# Patient Record
Sex: FEMALE | Race: WHITE | Employment: FULL TIME | URBAN - METROPOLITAN AREA
[De-identification: names, ages, dates, MRNs, and addresses within clinical notes are randomized per-mention and may not be internally consistent; named-entity substitution may affect disease eponyms.]

---

## 2017-03-27 ENCOUNTER — APPOINTMENT (OUTPATIENT)
Dept: CT IMAGING | Age: 22
End: 2017-03-27
Payer: COMMERCIAL

## 2017-03-27 ENCOUNTER — HOSPITAL ENCOUNTER (EMERGENCY)
Age: 22
Discharge: HOME | End: 2017-03-27
Attending: EMERGENCY MEDICINE
Payer: COMMERCIAL

## 2017-03-27 VITALS
DIASTOLIC BLOOD PRESSURE: 70 MMHG | OXYGEN SATURATION: 98 % | RESPIRATION RATE: 16 BRPM | BODY MASS INDEX: 27.62 KG/M2 | SYSTOLIC BLOOD PRESSURE: 129 MMHG | WEIGHT: 176 LBS | HEIGHT: 67 IN | HEART RATE: 73 BPM | TEMPERATURE: 98.5 F

## 2017-03-27 DIAGNOSIS — N83.201 CYST OF RIGHT OVARY: Primary | ICD-10-CM

## 2017-03-27 DIAGNOSIS — B35.3 TINEA PEDIS OF BOTH FEET: ICD-10-CM

## 2017-03-27 LAB
-: ABNORMAL
ABSOLUTE EOS #: 0.1 K/UL (ref 0–0.4)
ABSOLUTE LYMPH #: 1.9 K/UL (ref 1.2–5.2)
ABSOLUTE MONO #: 0.5 K/UL (ref 0.2–0.8)
AMORPHOUS: ABNORMAL
ANION GAP SERPL CALCULATED.3IONS-SCNC: 15 MMOL/L (ref 9–17)
BACTERIA: ABNORMAL
BASOPHILS # BLD: 0 % (ref 0–2)
BASOPHILS ABSOLUTE: 0 K/UL (ref 0–0.2)
BILIRUBIN URINE: ABNORMAL
BUN BLDV-MCNC: 11 MG/DL (ref 6–20)
BUN/CREAT BLD: 14 (ref 9–20)
CALCIUM SERPL-MCNC: 9.4 MG/DL (ref 8.6–10.4)
CASTS UA: ABNORMAL /LPF
CHLORIDE BLD-SCNC: 95 MMOL/L (ref 98–107)
CHP ED QC CHECK: NORMAL
CO2: 22 MMOL/L (ref 20–31)
COLOR: YELLOW
COMMENT UA: ABNORMAL
CREAT SERPL-MCNC: 0.79 MG/DL (ref 0.5–0.9)
CRYSTALS, UA: ABNORMAL /HPF
DIFFERENTIAL TYPE: ABNORMAL
EOSINOPHILS RELATIVE PERCENT: 1 % (ref 1–4)
EPITHELIAL CELLS UA: ABNORMAL /HPF
GFR AFRICAN AMERICAN: >60 ML/MIN
GFR NON-AFRICAN AMERICAN: >60 ML/MIN
GFR SERPL CREATININE-BSD FRML MDRD: ABNORMAL ML/MIN/{1.73_M2}
GFR SERPL CREATININE-BSD FRML MDRD: ABNORMAL ML/MIN/{1.73_M2}
GLUCOSE BLD-MCNC: 88 MG/DL (ref 70–99)
GLUCOSE URINE: NEGATIVE
HCT VFR BLD CALC: 43.2 % (ref 36–46)
HEMOGLOBIN: 14.1 G/DL (ref 12–16)
KETONES, URINE: NEGATIVE
LEUKOCYTE ESTERASE, URINE: NEGATIVE
LYMPHOCYTES # BLD: 22 % (ref 25–45)
MCH RBC QN AUTO: 26.5 PG (ref 26–34)
MCHC RBC AUTO-ENTMCNC: 32.6 G/DL (ref 31–37)
MCV RBC AUTO: 81.4 FL (ref 80–100)
MONOCYTES # BLD: 6 % (ref 2–8)
MUCUS: ABNORMAL
NITRITE, URINE: NEGATIVE
OTHER OBSERVATIONS UA: ABNORMAL
PDW BLD-RTO: 14.2 % (ref 11.5–14.5)
PH UA: 5.5 (ref 5–8)
PLATELET # BLD: 323 K/UL (ref 130–400)
PLATELET ESTIMATE: ABNORMAL
PMV BLD AUTO: 8.2 FL (ref 6–12)
POTASSIUM SERPL-SCNC: 3.9 MMOL/L (ref 3.7–5.3)
PREGNANCY TEST URINE, POC: NORMAL
PROTEIN UA: ABNORMAL
RBC # BLD: 5.31 M/UL (ref 4–5.2)
RBC # BLD: ABNORMAL 10*6/UL
RBC UA: ABNORMAL /HPF (ref 0–2)
RENAL EPITHELIAL, UA: ABNORMAL /HPF
SEG NEUTROPHILS: 71 % (ref 34–64)
SEGMENTED NEUTROPHILS ABSOLUTE COUNT: 6.2 K/UL (ref 1.8–8)
SODIUM BLD-SCNC: 132 MMOL/L (ref 135–144)
SPECIFIC GRAVITY UA: 1.03 (ref 1–1.03)
TRICHOMONAS: ABNORMAL
TURBIDITY: ABNORMAL
URINE HGB: ABNORMAL
UROBILINOGEN, URINE: NORMAL
WBC # BLD: 8.7 K/UL (ref 4.5–13.5)
WBC # BLD: ABNORMAL 10*3/UL
WBC UA: ABNORMAL /HPF (ref 0–5)
YEAST: ABNORMAL

## 2017-03-27 PROCEDURE — 36415 COLL VENOUS BLD VENIPUNCTURE: CPT

## 2017-03-27 PROCEDURE — 99284 EMERGENCY DEPT VISIT MOD MDM: CPT

## 2017-03-27 PROCEDURE — 6360000004 HC RX CONTRAST MEDICATION: Performed by: NURSE PRACTITIONER

## 2017-03-27 PROCEDURE — 85025 COMPLETE CBC W/AUTO DIFF WBC: CPT

## 2017-03-27 PROCEDURE — 2580000003 HC RX 258: Performed by: NURSE PRACTITIONER

## 2017-03-27 PROCEDURE — 84703 CHORIONIC GONADOTROPIN ASSAY: CPT

## 2017-03-27 PROCEDURE — 80048 BASIC METABOLIC PNL TOTAL CA: CPT

## 2017-03-27 PROCEDURE — 6360000002 HC RX W HCPCS: Performed by: NURSE PRACTITIONER

## 2017-03-27 PROCEDURE — 96374 THER/PROPH/DIAG INJ IV PUSH: CPT

## 2017-03-27 PROCEDURE — 81001 URINALYSIS AUTO W/SCOPE: CPT

## 2017-03-27 PROCEDURE — 74177 CT ABD & PELVIS W/CONTRAST: CPT

## 2017-03-27 RX ORDER — ONDANSETRON 2 MG/ML
4 INJECTION INTRAMUSCULAR; INTRAVENOUS ONCE
Status: COMPLETED | OUTPATIENT
Start: 2017-03-27 | End: 2017-03-27

## 2017-03-27 RX ORDER — SODIUM CHLORIDE 9 MG/ML
INJECTION, SOLUTION INTRAVENOUS CONTINUOUS
Status: DISCONTINUED | OUTPATIENT
Start: 2017-03-27 | End: 2017-03-28 | Stop reason: HOSPADM

## 2017-03-27 RX ORDER — ACETAMINOPHEN AND CODEINE PHOSPHATE 300; 30 MG/1; MG/1
1 TABLET ORAL 3 TIMES DAILY PRN
Qty: 10 TABLET | Refills: 0 | Status: SHIPPED | OUTPATIENT
Start: 2017-03-27 | End: 2017-04-04 | Stop reason: ALTCHOICE

## 2017-03-27 RX ORDER — CLOTRIMAZOLE 1 %
CREAM (GRAM) TOPICAL
Qty: 1 TUBE | Refills: 0 | Status: SHIPPED | OUTPATIENT
Start: 2017-03-27 | End: 2017-04-03

## 2017-03-27 RX ADMIN — IOVERSOL 125 ML: 741 INJECTION INTRA-ARTERIAL; INTRAVENOUS at 20:32

## 2017-03-27 RX ADMIN — SODIUM CHLORIDE 100 ML/HR: 9 INJECTION, SOLUTION INTRAVENOUS at 20:14

## 2017-03-27 RX ADMIN — ONDANSETRON 4 MG: 2 INJECTION INTRAMUSCULAR; INTRAVENOUS at 20:18

## 2017-03-27 ASSESSMENT — PAIN DESCRIPTION - PROGRESSION: CLINICAL_PROGRESSION: NOT CHANGED

## 2017-03-27 ASSESSMENT — PAIN DESCRIPTION - LOCATION: LOCATION: ABDOMEN

## 2017-03-27 ASSESSMENT — PAIN SCALES - GENERAL: PAINLEVEL_OUTOF10: 10

## 2017-03-27 ASSESSMENT — PAIN DESCRIPTION - PAIN TYPE: TYPE: ACUTE PAIN

## 2017-03-27 ASSESSMENT — PAIN DESCRIPTION - FREQUENCY: FREQUENCY: INTERMITTENT

## 2017-03-27 ASSESSMENT — PAIN DESCRIPTION - DESCRIPTORS: DESCRIPTORS: ACHING;CRAMPING;SHARP

## 2017-03-28 LAB — HCG, PREGNANCY URINE (POC): NEGATIVE

## 2017-03-29 ENCOUNTER — TELEPHONE (OUTPATIENT)
Dept: FAMILY MEDICINE CLINIC | Age: 22
End: 2017-03-29

## 2017-03-31 ASSESSMENT — ENCOUNTER SYMPTOMS
COLOR CHANGE: 0
COUGH: 0
SHORTNESS OF BREATH: 0
NAUSEA: 0
DIARRHEA: 0
RHINORRHEA: 0
ABDOMINAL PAIN: 1
SORE THROAT: 0
SINUS PRESSURE: 0
WHEEZING: 0
VOMITING: 0
CONSTIPATION: 0

## 2017-04-04 ENCOUNTER — OFFICE VISIT (OUTPATIENT)
Dept: OBGYN CLINIC | Age: 22
End: 2017-04-04
Payer: COMMERCIAL

## 2017-04-04 VITALS
WEIGHT: 179 LBS | HEIGHT: 67 IN | HEART RATE: 78 BPM | BODY MASS INDEX: 28.09 KG/M2 | SYSTOLIC BLOOD PRESSURE: 108 MMHG | DIASTOLIC BLOOD PRESSURE: 72 MMHG

## 2017-04-04 DIAGNOSIS — N83.201 RIGHT OVARIAN CYST: Primary | ICD-10-CM

## 2017-04-04 PROCEDURE — 99213 OFFICE O/P EST LOW 20 MIN: CPT | Performed by: OBSTETRICS & GYNECOLOGY

## 2017-04-04 ASSESSMENT — ENCOUNTER SYMPTOMS
BACK PAIN: 1
COUGH: 0
EYE PAIN: 0
ABDOMINAL PAIN: 1
PHOTOPHOBIA: 0
CONSTIPATION: 0
ABDOMINAL DISTENTION: 0
SHORTNESS OF BREATH: 0

## 2017-10-30 ENCOUNTER — HOSPITAL ENCOUNTER (EMERGENCY)
Age: 22
Discharge: HOME | End: 2017-10-30
Payer: COMMERCIAL

## 2017-10-30 VITALS
TEMPERATURE: 98.6 F | RESPIRATION RATE: 16 BRPM | OXYGEN SATURATION: 100 % | DIASTOLIC BLOOD PRESSURE: 86 MMHG | SYSTOLIC BLOOD PRESSURE: 141 MMHG | HEART RATE: 90 BPM

## 2017-10-30 DIAGNOSIS — M26.629 TMJ SYNDROME: Primary | ICD-10-CM

## 2017-10-30 DIAGNOSIS — H92.01 RIGHT EAR PAIN: ICD-10-CM

## 2017-10-30 PROCEDURE — 99282 EMERGENCY DEPT VISIT SF MDM: CPT

## 2017-10-30 RX ORDER — IBUPROFEN 600 MG/1
600 TABLET ORAL EVERY 6 HOURS PRN
Qty: 20 TABLET | Refills: 3 | Status: SHIPPED | OUTPATIENT
Start: 2017-10-30 | End: 2019-05-18

## 2017-10-30 ASSESSMENT — PAIN SCALES - GENERAL: PAINLEVEL_OUTOF10: 6

## 2017-10-30 ASSESSMENT — PAIN DESCRIPTION - ORIENTATION: ORIENTATION: RIGHT;LEFT

## 2017-10-30 ASSESSMENT — PAIN DESCRIPTION - LOCATION: LOCATION: EAR

## 2017-10-30 ASSESSMENT — PAIN DESCRIPTION - FREQUENCY: FREQUENCY: INTERMITTENT

## 2017-10-30 ASSESSMENT — PAIN DESCRIPTION - DESCRIPTORS: DESCRIPTORS: ACHING;CONSTANT

## 2017-10-30 NOTE — ED PROVIDER NOTES
[facility] ED  Emergency Department  Faculty Attestation     Pt Name: Alyssa Taylor  MRN: [id number]  [place] 1995  Date of evaluation: 10/30/17    I was personally available for consultation in the Emergency Department. Have reviewed everything on the chart that is available and agree with the documentation provided by the [facility] AND [facility], including discussion about the assessment, treatment plan and disposition. Alyssa Taylor is a 25 y.o. female who presents with Otalgia (bilateral)      Vitals:   Vitals:    10/30/17 1653   BP: (!) 141/86   Pulse: 90   Resp: 16   Temp: 98.6 °F (37 °C)   SpO2: 100%       Impression:   1. TMJ syndrome    2.  Right ear pain        Fay Jefferson MD  Attending Emergency Physician      (Please note that portions of this note were completed with a voice recognition program.  Efforts were made to edit the dictations but occasionally words are mis-transcribed.)        Fay Jefferson MD  10/31/17 2796
Status    Paternal Uncle     Paternal Grandmother     Paternal Grandfather         SOCIAL HISTORY      reports that she has quit smoking. She has never used smokeless tobacco. She reports that she does not drink alcohol or use drugs. REVIEW OF SYSTEMS    (2-9 systems for level 4, 10 or more for level 5)     REVIEW OF SYSTEMS    Constitutional:  Denies fever, chills, or weakness   Eyes:  Denies vision changes  HENT:  Denies sore throat or neck pain   Respiratory:  Denies cough or shortness of breath   Cardiovascular:  Denies chest pain  GI:  Denies abdominal pain, nausea, vomiting, or diarrhea   Musculoskeletal:  Denies back pain   Skin:  Denies rash  : All systems negative except as marked. Except as noted above the remainder of the review of systems was reviewed and negative. PHYSICAL EXAM    (up to 7 for level 4, 8 or more for level 5)     ED Triage Vitals [10/30/17 1653]   BP Temp Temp src Pulse Resp SpO2 Height Weight   (!) 141/86 98.6 °F (37 °C) -- 90 16 100 % -- --       General Appearance:  Alert, cooperative, no distress , pleasant   Head:  Normocephalic, without obvious abnormality, atraumatic. Eyes:  conjunctiva/corneas clear, EOM's intact. Sclera anicteric. ENT: Mucous membranes moist. Nasal exam reveals no swollen turbinates, septum midline. Tympanic membranes are gray and flat with landmarks well identified. Posterior pharynx pink, without swelling or exudate. Noted grind with click over the TMJ joints, the right side greater than the left. No trismus. Neck: Supple, symmetrical, trachea midline, no adenopathy. Lungs:   Respirations eupneic. Lungs CTA   Chest Wall:  Nontender   Heart:   Abdomen:     Extremities:   Pulses:   Skin:   Neurologic:      RRR  Soft, nontender    Full range of motion. Radial/ulnar pulses 3+    No rashes or lesions to exposed skin. Alert and oriented X 3. Motor grossly normal.  Speech clear.                DIAGNOSTIC RESULTS     No orders to

## 2017-11-01 ENCOUNTER — CARE COORDINATION (OUTPATIENT)
Dept: CARE COORDINATION | Age: 22
End: 2017-11-01

## 2018-12-02 ENCOUNTER — APPOINTMENT (OUTPATIENT)
Dept: GENERAL RADIOLOGY | Age: 23
End: 2018-12-02
Payer: COMMERCIAL

## 2018-12-02 ENCOUNTER — HOSPITAL ENCOUNTER (OUTPATIENT)
Age: 23
Setting detail: OBSERVATION
Discharge: HOME | End: 2018-12-04
Attending: EMERGENCY MEDICINE | Admitting: INTERNAL MEDICINE
Payer: COMMERCIAL

## 2018-12-02 ENCOUNTER — APPOINTMENT (OUTPATIENT)
Dept: CT IMAGING | Age: 23
End: 2018-12-02
Payer: COMMERCIAL

## 2018-12-02 DIAGNOSIS — R56.9 OBSERVED SEIZURE-LIKE ACTIVITY (HCC): Primary | ICD-10-CM

## 2018-12-02 LAB
% CKMB: 0.7 % (ref 0–3)
-: ABNORMAL
ABSOLUTE EOS #: 0.2 K/UL (ref 0–0.4)
ABSOLUTE IMMATURE GRANULOCYTE: ABNORMAL K/UL (ref 0–0.3)
ABSOLUTE LYMPH #: 1.1 K/UL (ref 1–4.8)
ABSOLUTE MONO #: 0.5 K/UL (ref 0.2–0.8)
ACETAMINOPHEN LEVEL: <10 UG/ML (ref 10–30)
ALBUMIN SERPL-MCNC: 4.7 G/DL (ref 3.5–5.2)
ALBUMIN/GLOBULIN RATIO: NORMAL (ref 1–2.5)
ALP BLD-CCNC: 61 U/L (ref 35–104)
ALT SERPL-CCNC: 19 U/L (ref 5–33)
AMORPHOUS: ABNORMAL
AMPHETAMINE SCREEN URINE: NEGATIVE
ANION GAP SERPL CALCULATED.3IONS-SCNC: 13 MMOL/L (ref 9–17)
AST SERPL-CCNC: 24 U/L
BACTERIA: ABNORMAL
BARBITURATE SCREEN URINE: NEGATIVE
BASOPHILS # BLD: 0 % (ref 0–2)
BASOPHILS ABSOLUTE: 0 K/UL (ref 0–0.2)
BENZODIAZEPINE SCREEN, URINE: NEGATIVE
BILIRUB SERPL-MCNC: 0.56 MG/DL (ref 0.3–1.2)
BILIRUBIN DIRECT: 0.13 MG/DL
BILIRUBIN URINE: NEGATIVE
BILIRUBIN, INDIRECT: 0.43 MG/DL (ref 0–1)
BUN BLDV-MCNC: 9 MG/DL (ref 6–20)
BUN/CREAT BLD: 14 (ref 9–20)
BUPRENORPHINE URINE: ABNORMAL
CALCIUM SERPL-MCNC: 9.6 MG/DL (ref 8.6–10.4)
CANNABINOID SCREEN URINE: POSITIVE
CASTS UA: ABNORMAL /LPF
CHLORIDE BLD-SCNC: 102 MMOL/L (ref 98–107)
CHP ED QC CHECK: NORMAL
CK MB: 1.4 NG/ML
CKMB INTERPRETATION: ABNORMAL
CO2: 23 MMOL/L (ref 20–31)
COCAINE METABOLITE, URINE: NEGATIVE
COLOR: YELLOW
COMMENT UA: ABNORMAL
CREAT SERPL-MCNC: 0.64 MG/DL (ref 0.5–0.9)
CRYSTALS, UA: ABNORMAL /HPF
DIFFERENTIAL TYPE: ABNORMAL
EKG ATRIAL RATE: 64 BPM
EKG P AXIS: 41 DEGREES
EKG P-R INTERVAL: 98 MS
EKG Q-T INTERVAL: 422 MS
EKG QRS DURATION: 116 MS
EKG QTC CALCULATION (BAZETT): 435 MS
EKG R AXIS: 75 DEGREES
EKG T AXIS: 64 DEGREES
EKG VENTRICULAR RATE: 64 BPM
EOSINOPHILS RELATIVE PERCENT: 3 % (ref 1–4)
EPITHELIAL CELLS UA: ABNORMAL /HPF (ref 0–5)
ETHANOL PERCENT: <0.01 %
ETHANOL: <10 MG/DL
GFR AFRICAN AMERICAN: >60 ML/MIN
GFR NON-AFRICAN AMERICAN: >60 ML/MIN
GFR SERPL CREATININE-BSD FRML MDRD: NORMAL ML/MIN/{1.73_M2}
GFR SERPL CREATININE-BSD FRML MDRD: NORMAL ML/MIN/{1.73_M2}
GLOBULIN: NORMAL G/DL (ref 1.5–3.8)
GLUCOSE BLD-MCNC: 86 MG/DL (ref 70–99)
GLUCOSE URINE: NEGATIVE
HCT VFR BLD CALC: 41.6 % (ref 36–46)
HEMOGLOBIN: 13.8 G/DL (ref 12–16)
IMMATURE GRANULOCYTES: ABNORMAL %
KETONES, URINE: NEGATIVE
LEUKOCYTE ESTERASE, URINE: NEGATIVE
LYMPHOCYTES # BLD: 13 % (ref 24–44)
MAGNESIUM: 2.2 MG/DL (ref 1.6–2.6)
MCH RBC QN AUTO: 27.9 PG (ref 26–34)
MCHC RBC AUTO-ENTMCNC: 33.1 G/DL (ref 31–37)
MCV RBC AUTO: 84.2 FL (ref 80–100)
MDMA URINE: ABNORMAL
METHADONE SCREEN, URINE: NEGATIVE
METHAMPHETAMINE, URINE: ABNORMAL
MONOCYTES # BLD: 5 % (ref 1–7)
MONONUCLEOSIS SCREEN: NEGATIVE
MUCUS: ABNORMAL
NITRITE, URINE: NEGATIVE
NRBC AUTOMATED: ABNORMAL PER 100 WBC
OPIATES, URINE: NEGATIVE
OTHER OBSERVATIONS UA: ABNORMAL
OXYCODONE SCREEN URINE: NEGATIVE
PDW BLD-RTO: 13.4 % (ref 11.5–14.5)
PH UA: 8.5 (ref 5–8)
PHENCYCLIDINE, URINE: NEGATIVE
PLATELET # BLD: 296 K/UL (ref 130–400)
PLATELET ESTIMATE: ABNORMAL
PMV BLD AUTO: 8 FL (ref 6–12)
POTASSIUM SERPL-SCNC: 4.6 MMOL/L (ref 3.7–5.3)
PREGNANCY TEST URINE, POC: NORMAL
PROPOXYPHENE, URINE: ABNORMAL
PROTEIN UA: NEGATIVE
RBC # BLD: 4.94 M/UL (ref 4–5.2)
RBC # BLD: ABNORMAL 10*6/UL
RBC UA: ABNORMAL /HPF (ref 0–2)
RENAL EPITHELIAL, UA: ABNORMAL /HPF
SALICYLATE LEVEL: <1 MG/DL (ref 3–10)
SEG NEUTROPHILS: 79 % (ref 36–66)
SEGMENTED NEUTROPHILS ABSOLUTE COUNT: 6.9 K/UL (ref 1.8–7.7)
SODIUM BLD-SCNC: 138 MMOL/L (ref 135–144)
SPECIFIC GRAVITY UA: 1.01 (ref 1–1.03)
TEST INFORMATION: ABNORMAL
TOTAL CK: 214 U/L (ref 26–192)
TOTAL PROTEIN: 7.4 G/DL (ref 6.4–8.3)
TOXIC TRICYCLIC SC,BLOOD: NEGATIVE
TRICHOMONAS: ABNORMAL
TRICYCLIC ANTIDEPRESSANTS, UR: ABNORMAL
TURBIDITY: CLEAR
URINE HGB: ABNORMAL
UROBILINOGEN, URINE: NORMAL
WBC # BLD: 8.8 K/UL (ref 3.5–11)
WBC # BLD: ABNORMAL 10*3/UL
WBC UA: ABNORMAL /HPF (ref 0–5)
YEAST: ABNORMAL

## 2018-12-02 PROCEDURE — 85025 COMPLETE CBC W/AUTO DIFF WBC: CPT

## 2018-12-02 PROCEDURE — 96374 THER/PROPH/DIAG INJ IV PUSH: CPT

## 2018-12-02 PROCEDURE — 99218 PR INITIAL OBSERVATION CARE/DAY 30 MINUTES: CPT | Performed by: NURSE PRACTITIONER

## 2018-12-02 PROCEDURE — 83735 ASSAY OF MAGNESIUM: CPT

## 2018-12-02 PROCEDURE — 80076 HEPATIC FUNCTION PANEL: CPT

## 2018-12-02 PROCEDURE — 80307 DRUG TEST PRSMV CHEM ANLYZR: CPT

## 2018-12-02 PROCEDURE — G0378 HOSPITAL OBSERVATION PER HR: HCPCS

## 2018-12-02 PROCEDURE — 70450 CT HEAD/BRAIN W/O DYE: CPT

## 2018-12-02 PROCEDURE — 81001 URINALYSIS AUTO W/SCOPE: CPT

## 2018-12-02 PROCEDURE — 6360000002 HC RX W HCPCS: Performed by: EMERGENCY MEDICINE

## 2018-12-02 PROCEDURE — 86308 HETEROPHILE ANTIBODY SCREEN: CPT

## 2018-12-02 PROCEDURE — 96361 HYDRATE IV INFUSION ADD-ON: CPT

## 2018-12-02 PROCEDURE — 71045 X-RAY EXAM CHEST 1 VIEW: CPT

## 2018-12-02 PROCEDURE — 2580000003 HC RX 258: Performed by: EMERGENCY MEDICINE

## 2018-12-02 PROCEDURE — 82553 CREATINE MB FRACTION: CPT

## 2018-12-02 PROCEDURE — 99285 EMERGENCY DEPT VISIT HI MDM: CPT

## 2018-12-02 PROCEDURE — 93005 ELECTROCARDIOGRAM TRACING: CPT

## 2018-12-02 PROCEDURE — 87086 URINE CULTURE/COLONY COUNT: CPT

## 2018-12-02 PROCEDURE — 82550 ASSAY OF CK (CPK): CPT

## 2018-12-02 PROCEDURE — 80048 BASIC METABOLIC PNL TOTAL CA: CPT

## 2018-12-02 PROCEDURE — G0480 DRUG TEST DEF 1-7 CLASSES: HCPCS

## 2018-12-02 PROCEDURE — 84703 CHORIONIC GONADOTROPIN ASSAY: CPT

## 2018-12-02 RX ORDER — ONDANSETRON 2 MG/ML
4 INJECTION INTRAMUSCULAR; INTRAVENOUS ONCE
Status: COMPLETED | OUTPATIENT
Start: 2018-12-02 | End: 2018-12-02

## 2018-12-02 RX ORDER — LORAZEPAM 2 MG/ML
2 INJECTION INTRAMUSCULAR EVERY 4 HOURS PRN
Status: DISCONTINUED | OUTPATIENT
Start: 2018-12-02 | End: 2018-12-04 | Stop reason: HOSPADM

## 2018-12-02 RX ORDER — 0.9 % SODIUM CHLORIDE 0.9 %
1000 INTRAVENOUS SOLUTION INTRAVENOUS ONCE
Status: COMPLETED | OUTPATIENT
Start: 2018-12-02 | End: 2018-12-02

## 2018-12-02 RX ADMIN — SODIUM CHLORIDE 1000 ML: 9 INJECTION, SOLUTION INTRAVENOUS at 19:58

## 2018-12-02 RX ADMIN — ONDANSETRON 4 MG: 2 INJECTION INTRAMUSCULAR; INTRAVENOUS at 19:58

## 2018-12-02 ASSESSMENT — PAIN SCALES - GENERAL: PAINLEVEL_OUTOF10: 0

## 2018-12-02 NOTE — ED NOTES
Patient is brought in by friend and family and presents with possible seizure this afternoon. Patients friend who was presents states that patient was sleeping when he noticed that she was moving erratically and drooling. Friend tried to wake patient but was unable and states that patient continued with symptoms for 5-10 minutes, but is not sure how long. Patient is alert and oriented and has no history of seizures and walks to room.        Pau Lamas RN  12/02/18 1457

## 2018-12-03 ENCOUNTER — APPOINTMENT (OUTPATIENT)
Dept: MRI IMAGING | Age: 23
End: 2018-12-03
Payer: COMMERCIAL

## 2018-12-03 LAB
ANION GAP SERPL CALCULATED.3IONS-SCNC: 13 MMOL/L (ref 9–17)
BUN BLDV-MCNC: 11 MG/DL (ref 6–20)
BUN/CREAT BLD: 14 (ref 9–20)
CALCIUM SERPL-MCNC: 9 MG/DL (ref 8.6–10.4)
CHLORIDE BLD-SCNC: 103 MMOL/L (ref 98–107)
CO2: 24 MMOL/L (ref 20–31)
CREAT SERPL-MCNC: 0.77 MG/DL (ref 0.5–0.9)
CULTURE: NORMAL
GFR AFRICAN AMERICAN: >60 ML/MIN
GFR NON-AFRICAN AMERICAN: >60 ML/MIN
GFR SERPL CREATININE-BSD FRML MDRD: NORMAL ML/MIN/{1.73_M2}
GFR SERPL CREATININE-BSD FRML MDRD: NORMAL ML/MIN/{1.73_M2}
GLUCOSE BLD-MCNC: 91 MG/DL (ref 70–99)
HCG, PREGNANCY URINE (POC): NEGATIVE
HCT VFR BLD CALC: 38.1 % (ref 36–46)
HEMOGLOBIN: 12.8 G/DL (ref 12–16)
Lab: NORMAL
MCH RBC QN AUTO: 28.2 PG (ref 26–34)
MCHC RBC AUTO-ENTMCNC: 33.5 G/DL (ref 31–37)
MCV RBC AUTO: 84.2 FL (ref 80–100)
NRBC AUTOMATED: NORMAL PER 100 WBC
PDW BLD-RTO: 13.1 % (ref 11.5–14.5)
PLATELET # BLD: 293 K/UL (ref 130–400)
PMV BLD AUTO: 8 FL (ref 6–12)
POTASSIUM SERPL-SCNC: 3.8 MMOL/L (ref 3.7–5.3)
RBC # BLD: 4.52 M/UL (ref 4–5.2)
SODIUM BLD-SCNC: 140 MMOL/L (ref 135–144)
SPECIMEN DESCRIPTION: NORMAL
STATUS: NORMAL
WBC # BLD: 8.4 K/UL (ref 3.5–11)

## 2018-12-03 PROCEDURE — A9579 GAD-BASE MR CONTRAST NOS,1ML: HCPCS | Performed by: PSYCHIATRY & NEUROLOGY

## 2018-12-03 PROCEDURE — 6370000000 HC RX 637 (ALT 250 FOR IP): Performed by: INTERNAL MEDICINE

## 2018-12-03 PROCEDURE — 99225 PR SBSQ OBSERVATION CARE/DAY 25 MINUTES: CPT | Performed by: INTERNAL MEDICINE

## 2018-12-03 PROCEDURE — 6360000002 HC RX W HCPCS: Performed by: NURSE PRACTITIONER

## 2018-12-03 PROCEDURE — 36415 COLL VENOUS BLD VENIPUNCTURE: CPT

## 2018-12-03 PROCEDURE — 70553 MRI BRAIN STEM W/O & W/DYE: CPT

## 2018-12-03 PROCEDURE — 85027 COMPLETE CBC AUTOMATED: CPT

## 2018-12-03 PROCEDURE — 6360000004 HC RX CONTRAST MEDICATION: Performed by: PSYCHIATRY & NEUROLOGY

## 2018-12-03 PROCEDURE — G0378 HOSPITAL OBSERVATION PER HR: HCPCS

## 2018-12-03 PROCEDURE — 95816 EEG AWAKE AND DROWSY: CPT

## 2018-12-03 PROCEDURE — 96375 TX/PRO/DX INJ NEW DRUG ADDON: CPT

## 2018-12-03 PROCEDURE — 2580000003 HC RX 258: Performed by: PSYCHIATRY & NEUROLOGY

## 2018-12-03 PROCEDURE — 80048 BASIC METABOLIC PNL TOTAL CA: CPT

## 2018-12-03 RX ORDER — ACETAMINOPHEN 325 MG/1
650 TABLET ORAL EVERY 4 HOURS PRN
Status: DISCONTINUED | OUTPATIENT
Start: 2018-12-03 | End: 2018-12-04 | Stop reason: HOSPADM

## 2018-12-03 RX ORDER — LORAZEPAM 2 MG/ML
0.5 INJECTION INTRAMUSCULAR ONCE
Status: COMPLETED | OUTPATIENT
Start: 2018-12-04 | End: 2018-12-03

## 2018-12-03 RX ORDER — SODIUM CHLORIDE 0.9 % (FLUSH) 0.9 %
10 SYRINGE (ML) INJECTION
Status: COMPLETED | OUTPATIENT
Start: 2018-12-03 | End: 2018-12-03

## 2018-12-03 RX ADMIN — LORAZEPAM 0.5 MG: 2 INJECTION, SOLUTION INTRAMUSCULAR; INTRAVENOUS at 23:45

## 2018-12-03 RX ADMIN — GADOTERIDOL 15 ML: 279.3 INJECTION, SOLUTION INTRAVENOUS at 13:41

## 2018-12-03 RX ADMIN — Medication 10 ML: at 13:41

## 2018-12-03 RX ADMIN — ACETAMINOPHEN 650 MG: 325 TABLET ORAL at 10:54

## 2018-12-03 ASSESSMENT — PAIN DESCRIPTION - LOCATION: LOCATION: HEAD

## 2018-12-03 ASSESSMENT — PAIN SCALES - GENERAL
PAINLEVEL_OUTOF10: 0
PAINLEVEL_OUTOF10: 3

## 2018-12-03 ASSESSMENT — PAIN DESCRIPTION - PAIN TYPE: TYPE: ACUTE PAIN

## 2018-12-03 ASSESSMENT — PAIN DESCRIPTION - FREQUENCY: FREQUENCY: INTERMITTENT

## 2018-12-03 ASSESSMENT — PAIN DESCRIPTION - ORIENTATION: ORIENTATION: ANTERIOR

## 2018-12-03 ASSESSMENT — PAIN DESCRIPTION - ONSET: ONSET: GRADUAL

## 2018-12-03 ASSESSMENT — PAIN DESCRIPTION - PROGRESSION: CLINICAL_PROGRESSION: GRADUALLY WORSENING

## 2018-12-03 NOTE — PROGRESS NOTES
Pt admitted to room 2020 from ER at 2306  Patient alert and oriented x4. Oriented to room and call light/tv controls. Bed in lowest position, wheels locked, 2/4 side rails up  Seizure pads on bed. Call light in reach, room free of clutter, adequate lighting provided. Boyfriend at bedside. IV intact.

## 2018-12-03 NOTE — H&P
drinking, increase in urination, hot or cold intolerance  MUSCULOSKELETAL:  negative joint pains, muscle aches, swelling of joints  NEUROLOGICAL:  negative for headaches, dizziness, lightheadedness, numbness, pain, tingling extremities  BEHAVIOR/PSYCH:  negative for depression, anxiety    Physical Exam:   /64   Pulse 89   Temp 98.8 °F (37.1 °C) (Oral)   Resp 16   Ht 5' 6\" (1.676 m)   Wt 160 lb (72.6 kg)   LMP 2018   SpO2 97%   BMI 25.82 kg/m²   Temp (24hrs), Av.7 °F (37.1 °C), Min:98.5 °F (36.9 °C), Max:98.8 °F (37.1 °C)    No results for input(s): POCGLU in the last 72 hours. No intake or output data in the 24 hours ending 18 0102    General Appearance:  alert, well appearing, and in no acute distress  Mental status: oriented to person, place, and time with normal affect  Head:  normocephalic, atraumatic. Eye: no icterus, redness, pupils equal and reactive, extraocular eye movements intact, conjunctiva clear  Ear: normal external ear, no discharge, hearing intact  Nose:  no drainage noted. She has mild pain to the proximal area of her nose status post hitting it on a window seal about a week ago. Mouth: mucous membranes moist. Good dentition  Neck: supple, no carotid bruits, thyroid not palpable  Lungs: Bilateral equal air entry, clear to auscultation, no wheezing, rales or rhonchi, normal effort  Cardiovascular: normal rate, regular rhythm, no murmur, gallop, rub. Abdomen: Soft, nontender, nondistended, normal bowel sounds, no hepatomegaly or splenomegaly  Neurologic: There are no new focal motor or sensory deficits, normal muscle tone and bulk, no abnormal sensation, normal speech, cranial nerves II through XII grossly intact  Skin: No gross lesions, rashes, bruising or bleeding on exposed skin area.  Her skin is warm and dry  Extremities:  peripheral pulses palpable, no pedal edema or calf pain with palpation  Psych: normal affect     Investigations:      Laboratory

## 2018-12-03 NOTE — PROGRESS NOTES
him. She doesn't recall any of these events. She denies history of seizure activity. She states over the last 3 days she's had loose stools approximately once a day but she's had no other signs and symptoms of illness. When asked about any trauma she states about a week and half ago while helping move some furniture she hit her face on a window seal. She said the area at the top part of her nose, between her eyes has been sore since then. \"    ROS:  Constitutional: Negative for chills, diaphoresis, fever, malaise/fatigue and weight loss. HENT: Negative for ear pain, hearing loss, nosebleeds, sore throat and tinnitus. Eyes: Negative for blurred vision, double vision, photophobia and pain. Respiratory: Negative for cough, hemoptysis, sputum production, shortness of breath and wheezing. Cardiovascular: Negative for palpitations, orthopnea, claudication, leg swelling and PND. Gastrointestinal: Negative for abdominal pain, blood in stool, constipation, diarrhea, heartburn, melena, nausea and vomiting. Genitourinary: Negative for dysuria, flank pain, frequency, hematuria and urgency. Musculoskeletal: Negative for back pain, falls, joint pain, myalgias and neck pain. Skin: Negative for itching and rash. Neurological: Negative for dizziness, tingling, tremors, sensory change, focal weakness, weakness and headaches. This 1st episode of seizure-like activity. Endo/Heme/Allergies: Does not bruise/bleed easily. Psychiatric/Behavioral: Negative for depression. The patient is not nervous/anxious. Medications: Allergies: No Known Allergies    Current Meds:   PRN Meds:   acetaminophen 650 mg Oral Q4H PRN   LORazepam 2 mg Intravenous Q4H PRN       Data:     Code Status:  No Order     CT Head Wo Contrast  Result Date: 12/2/2018  FINDINGS:   BRAIN/VENTRICLES: There is no acute intracranial hemorrhage, mass effect or midline shift. No abnormal extra-axial fluid collection.   The gray-white differentiation is maintained without evidence of an acute infarct. There is no evidence of hydrocephalus. ORBITS: The visualized portion of the orbits demonstrate no acute abnormality. SINUSES: The visualized paranasal sinuses and mastoid air cells demonstrate no acute abnormality. SOFT TISSUES/SKULL:  No acute abnormality of the visualized skull or soft tissues. Impression:  No acute intracranial abnormality. XR Chest Portable  Result Date: 12/2/2018  COMPARISON: Two views of the chest 03/11/2016  FINDINGS: The heart size and mediastinal contours are normal.  No focal lung consolidation or evidence of pulmonary edema. The costophrenic angles are preserved. Impression:  No acute findings. MRI Brain W Wo Contrast  Result Date: 12/3/2018  COMPARISON: CT brain performed 12/02/2018  FINDINGS:   INTRACRANIAL STRUCTURES/VENTRICLES:  The sellar and suprasellar structures, optic chiasm, corpus callosum, pineal gland, tectum, and midline brainstem structures are unremarkable. There is a degree of cerebellar tonsillar ectopia below the foramen magnum measuring approximately 8 mm. There is no acute intracranial hemorrhage, mass effect, or midline shift. There is satisfactory overall gray-white matter differentiation. The ventricular structures are symmetric and unremarkable. The infratentorial structures including the cerebellopontine angles and internal auditory canals are unremarkable. There is no abnormal restricted diffusion. There is no abnormal blooming artifact on susceptibility weighted imaging. There is no abnormal postcontrast enhancement. ORBITS: The visualized portion of the orbits demonstrate no acute abnormality. SINUSES: The visualized paranasal sinuses and mastoid air cells are well aerated. BONES/SOFT TISSUES: The bone marrow signal intensity appears normal. The soft tissues demonstrate no acute abnormality. Impression:  No acute intracranial abnormality.   No abnormal postcontrast enhancement. 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      Labs:    Hematology:  Recent Labs      12/02/18 1927 12/03/18   0625   WBC  8.8  8.4   RBC  4.94  4.52   HGB  13.8  12.8   HCT  41.6  38.1   MCV  84.2  84.2   MCH  27.9  28.2   MCHC  33.1  33.5   RDW  13.4  13.1   PLT  296  293   MPV  8.0  8.0     Chemistry:  Recent Labs      12/02/18 1927 12/03/18   0625   NA  138  140   K  4.6  3.8   CL  102  103   CO2  23  24   GLUCOSE  86  91   BUN  9  11   CREATININE  0.64  0.77   MG  2.2   --    ANIONGAP  13  13   LABGLOM  >60  >60   GFRAA  >60  >60   CALCIUM  9.6  9.0   CKTOTAL  214*   --    CKMB  1.4   --      Recent Labs      12/02/18 1927   PROT  7.4   LABALBU  4.7   AST  24   ALT  19   ALKPHOS  61   BILITOT  0.56   BILIDIR  0.13         Physical Examination:    /60   Pulse 64   Temp 98.7 °F (37.1 °C) (Oral)   Resp 16   Ht 5' 6\" (1.676 m)   Wt 156 lb 11.2 oz (71.1 kg)   LMP 11/18/2018   SpO2 97%   BMI 25.29 kg/m²   Intake/Output Summary (Last 24 hours) at 12/03/18 1631  Last data filed at 12/03/18 2963   Gross per 24 hour   Intake                0 ml   Output              500 ml   Net             -500 ml       General Appearance:    Alert, cooperative, no distress, appears stated age   Head:    Normocephalic, without obvious abnormality, atraumatic   Eyes:    PERRL, conjunctiva/corneas clear, EOM's intact        Ears:    Normal external ear canals, both ears   Nose:   Nares normal, septum midline, mucosa normal, no drainage    or sinus tenderness   Throat:   Lips, mucosa, and tongue normal; teeth and gums normal   Neck:   Supple, symmetrical, trachea midline, no adenopathy;        thyroid:  No enlargement/tenderness/nodules; no carotid    bruit or JVD   Back:     Symmetric, no curvature, ROM normal, no CVA tenderness   Lungs:     Clear to auscultation bilaterally, respirations unlabored   Chest wall:    No

## 2018-12-03 NOTE — ED PROVIDER NOTES
Cancer-Smoker    Other Paternal Grandmother         Hysterectomy due to some type of cancer    Other Paternal Grandfather         DM2, COPD, MI X4     Family Status   Relation Status    PUnc (Not Specified)    PGM (Not Specified)    PGF (Not Specified)        SOCIAL HISTORY      reports that she has quit smoking. She has never used smokeless tobacco. She reports that she does not drink alcohol or use drugs. REVIEW OF SYSTEMS    (2-9 systems for level 4, 10 or more for level 5)     Review of Systems   All other systems reviewed and are negative. Except as noted above the remainder of the review of systems was reviewed and negative. PHYSICAL EXAM    (up to 7 for level 4, 8 or more for level 5)     Vitals:    12/02/18 1733   BP: 124/69   Pulse: 89   Resp: 16   Temp: 98.5 °F (36.9 °C)   TempSrc: Oral   SpO2: 98%   Weight: 160 lb (72.6 kg)   Height: 5' 6\" (1.676 m)       Physical exam reflects a well-nourished well-hydrated female. She is afebrile with stable vital signs to include pulse ox of 98% on room air. She's not hypoxic. She is alert conversive and appropriate behavior. She is not in distress. GCS 15. Cranial nerves II through XII are grossly intact. Visual acuities are grossly normal.  Neck soft and supple no meningismus. Oropharyngeal exam without lesion. Trachea midline no stridor. No difficulty breathing speaking or swallowing. Heart regular rate and rhythm normal S1-S2 no murmurs rubs gallops. Lungs are clear to auscultation without wheezes rales or rhonchi. Abdomen is soft throughout no focal pain. Extremities show no deformities. No bony point tenderness decreased range of motion or complaint of pain. Integument shows a few scattered petechial like lesions no other rash or lesion noted.   No gross neurovascular deficits      DIAGNOSTIC RESULTS     EKG: All EKG's are interpreted by the Emergency Department Physician who either signs or Co-signs this chart in the absence of a cardiologist.    EKG demonstrates sinus rhythm rate in the 60s. Right bundle branch block noted.   No ectopy or ischemic change noted    RADIOLOGY:   Non-plain film images such as CT, Ultrasound and MRI are read by the radiologist. Katelin Sanchez radiographic images are visualized and preliminarily interpreted by the emergency physician with the below findings:    CT Head WO Contrast   Status: Final result   Order Providers     Authorizing Billing   MD Dima Maya MD          Signed by     Signed Date/Time  Phone Pager   Lupe Parker 12/02/2018 20:58 [phone]    Reading Radiologists     Read Date Phone Pager   Lupe Parker Dec 2, 2018 [phone]    Radiation Dose Estimates     No radiation information found for this patient   Narrative   EXAMINATION:   CT OF THE HEAD WITHOUT CONTRAST  12/2/2018 7:45 pm       TECHNIQUE:   CT of the head was performed without the administration of intravenous   contrast. Dose modulation, iterative reconstruction, and/or weight based   adjustment of the mA/kV was utilized to reduce the radiation dose to as low   as reasonably achievable.       COMPARISON:   None.       HISTORY:   ORDERING SYSTEM PROVIDED HISTORY: seizure activity   TECHNOLOGIST PROVIDED HISTORY:           FINDINGS:   BRAIN/VENTRICLES: There is no acute intracranial hemorrhage, mass effect or   midline shift.  No abnormal extra-axial fluid collection.  The gray-white   differentiation is maintained without evidence of an acute infarct.  There is   no evidence of hydrocephalus.       ORBITS: The visualized portion of the orbits demonstrate no acute abnormality.       SINUSES: The visualized paranasal sinuses and mastoid air cells demonstrate   no acute abnormality.       SOFT TISSUES/SKULL:  No acute abnormality of the visualized skull or soft   tissues.           Impression   No acute intracranial abnormality.             Interpretation per the Radiologist below, if available at the time of this note:    XR CHEST PORTABLE   Final Result   No acute findings. CT Head WO Contrast   Final Result   No acute intracranial abnormality. LABS:  Labs Reviewed   TOX SCR, BLD, ED - Abnormal; Notable for the following:        Result Value    Salicylate Lvl <1 (*)     Acetaminophen Level <10 (*)     All other components within normal limits   URINALYSIS - Abnormal; Notable for the following:     Urine Hgb TRACE (*)     pH, UA 8.5 (*)     All other components within normal limits   URINE DRUG SCREEN - Abnormal; Notable for the following:     Cannabinoid Scrn, Ur POSITIVE (*)     All other components within normal limits   CK ISOENZYMES - Abnormal; Notable for the following: Total  (*)     All other components within normal limits   CBC WITH AUTO DIFFERENTIAL - Abnormal; Notable for the following:     Seg Neutrophils 79 (*)     Lymphocytes 13 (*)     All other components within normal limits   MICROSCOPIC URINALYSIS - Abnormal; Notable for the following:     Bacteria, UA MANY (*)     All other components within normal limits   POCT URINE PREGNANCY - Normal   URINE CULTURE CLEAN CATCH   BASIC METABOLIC PANEL   HEPATIC FUNCTION PANEL   MAGNESIUM   MONONUCLEOSIS SCREEN     Results for Yomaira Hicks (MRN [id number]) as of 12/2/2018 20:30   Ref.  Range 12/2/2018 18:40 12/2/2018 19:27 12/2/2018 19:34   Sodium Latest Ref Range: 135 - 144 mmol/L  138    Potassium Latest Ref Range: 3.7 - 5.3 mmol/L  4.6    Chloride Latest Ref Range: 98 - 107 mmol/L  102    CO2 Latest Ref Range: 20 - 31 mmol/L  23    BUN Latest Ref Range: 6 - 20 mg/dL  9    Creatinine Latest Ref Range: 0.50 - 0.90 mg/dL  0.64    Bun/Cre Ratio Latest Ref Range: 9 - 20   14    Anion Gap Latest Ref Range: 9 - 17 mmol/L  13    GFR Non- Latest Ref Range: >60 mL/min  >60    GFR  Latest Ref Range: >60 mL/min  >60    Magnesium Latest Ref Range: 1.6 - 2.6 mg/dL  2.2    Glucose Latest Physician         Deb oMbley MD  12/02/18 5629

## 2018-12-04 VITALS
DIASTOLIC BLOOD PRESSURE: 55 MMHG | SYSTOLIC BLOOD PRESSURE: 104 MMHG | WEIGHT: 159.5 LBS | BODY MASS INDEX: 25.63 KG/M2 | OXYGEN SATURATION: 98 % | TEMPERATURE: 98.2 F | HEIGHT: 66 IN | HEART RATE: 65 BPM | RESPIRATION RATE: 16 BRPM

## 2018-12-04 LAB
SEDIMENTATION RATE, ERYTHROCYTE: 3 MM (ref 0–20)
TSH SERPL DL<=0.05 MIU/L-ACNC: 1.66 MIU/L (ref 0.3–5)

## 2018-12-04 PROCEDURE — 6370000000 HC RX 637 (ALT 250 FOR IP): Performed by: PSYCHIATRY & NEUROLOGY

## 2018-12-04 PROCEDURE — 85651 RBC SED RATE NONAUTOMATED: CPT

## 2018-12-04 PROCEDURE — 99225 PR SBSQ OBSERVATION CARE/DAY 25 MINUTES: CPT | Performed by: INTERNAL MEDICINE

## 2018-12-04 PROCEDURE — G0378 HOSPITAL OBSERVATION PER HR: HCPCS

## 2018-12-04 PROCEDURE — 84443 ASSAY THYROID STIM HORMONE: CPT

## 2018-12-04 PROCEDURE — 36415 COLL VENOUS BLD VENIPUNCTURE: CPT

## 2018-12-04 RX ORDER — LEVETIRACETAM 500 MG/1
500 TABLET ORAL 2 TIMES DAILY
Status: DISCONTINUED | OUTPATIENT
Start: 2018-12-04 | End: 2018-12-04 | Stop reason: HOSPADM

## 2018-12-04 RX ORDER — LEVETIRACETAM 500 MG/1
500 TABLET ORAL 2 TIMES DAILY
Qty: 60 TABLET | Refills: 3 | Status: SHIPPED | OUTPATIENT
Start: 2018-12-04

## 2018-12-04 RX ADMIN — LEVETIRACETAM 500 MG: 500 TABLET ORAL at 13:19

## 2018-12-04 NOTE — DISCHARGE SUMMARY
Recent Labs      12/02/18 1927 12/03/18   0625  12/04/18   0738   WBC  8.8  8.4   --    RBC  4.94  4.52   --    HGB  13.8  12.8   --    HCT  41.6  38.1   --    MCV  84.2  84.2   --    MCH  27.9  28.2   --    MCHC  33.1  33.5   --    RDW  13.4  13.1   --    PLT  296  293   --    MPV  8.0  8.0   --    SEDRATE   --    --   3      Chemistry:       Recent Labs      12/02/18 1927 12/03/18   0625   NA  138  140   K  4.6  3.8   CL  102  103   CO2  23  24   GLUCOSE  86  91   BUN  9  11   CREATININE  0.64  0.77   MG  2.2   --    ANIONGAP  13  13   LABGLOM  >60  >60   GFRAA  >60  >60   CALCIUM  9.6  9.0   CKTOTAL  214*   --    CKMB  1.4   --            Recent Labs      12/02/18 1927 12/04/18   0738   PROT  7.4   --    LABALBU  4.7   --    TSH   --   1.66   AST  24   --    ALT  19   --    ALKPHOS  61   --    BILITOT  0.56   --    BILIDIR  0.13   --         Consultations:    Consults:     Final Specialist Recommendations/Findings:   IP CONSULT TO INTERNAL MEDICINE  IP CONSULT TO NEUROLOGY      The patient was seen and examined on day of discharge and this discharge summary is in conjunction with any daily progress note from day of discharge. Discharge plan:     Disposition: Home    Physician Follow Up:     Tran Daniels MD  [address] [place] [address]  [phone]    In 1 week         Requiring Further Evaluation/Follow Up POST HOSPITALIZATION/Incidental Findings:   Follow-up with neurology in 1 week  Discussed with neurology resumption of Lexapro    Diet: regular diet    Activity:   No driving motor vehicle for 6 months  Avoid using mechanical equipment  Avoid climbing ladders  Showers versus tub    Instructions to Patient:   Keppra 500 mg twice daily  Discussed with neurology resuming Lexapro  No driving motor vehicle for 6 months  Avoid using mechanical equipment  Avoid climbing ladders  Showers versus tub    Discharge Medications:      Medication List      START taking these medications    levETIRAcetam 500 MG tablet  Commonly known as:  KEPPRA  Take 1 tablet by mouth 2 times daily        CONTINUE taking these medications    acetaminophen 325 MG tablet  Commonly known as:  TYLENOL     * ibuprofen 200 MG tablet  Commonly known as:  ADVIL;MOTRIN     * ibuprofen 600 MG tablet  Commonly known as:  ADVIL;MOTRIN  Take 1 tablet by mouth every 6 hours as needed for Pain        * This list has 2 medication(s) that are the same as other medications prescribed for you. Read the directions carefully, and ask your doctor or other care provider to review them with you. STOP taking these medications    escitalopram 10 MG tablet  Commonly known as:  Annemarie Green           Where to Get Your Medications      You can get these medications from any pharmacy    Bring a paper prescription for each of these medications  · levETIRAcetam 500 MG tablet         Time Spent on discharge is  41 mins in patient examination, evaluation, counseling as well as medication reconciliation, prescriptions for required medications, discharge plan and follow up.     Electronically signed by   Usha Germain DO  12/4/2018  3:49 PM

## 2018-12-04 NOTE — PROGRESS NOTES
Upon doing my hourly rounding walked into the patient room and she was crying. Asked her what was wrong and she just said she wants to go home. Patient said she just needs something to help her sleep and she takes Benadryl at home when she cant sleep. Sent a secure message to [facility]. A one time dose of Ativan 0.5mg was ordered.

## 2018-12-04 NOTE — PLAN OF CARE
Problem: Injury - Risk of, Healthcare-Acquired Condition:  Goal: Will remain free from falls  Will remain free from falls   Outcome: Ongoing

## 2018-12-04 NOTE — PROGRESS NOTES
awake and he could not wake her. He was concerned she might choke so he rolled her to her side. He states it took several minutes to wake her up and when he finally got her to wake up she was just staring off and not really responding to him. She doesn't recall any of these events. She denies history of seizure activity. She states over the last 3 days she's had loose stools approximately once a day but she's had no other signs and symptoms of illness. When asked about any trauma she states about a week and half ago while helping move some furniture she hit her face on a window seal. She said the area at the top part of her nose, between her eyes has been sore since then. \"    ROS:  Constitutional: Negative for chills, diaphoresis, fever, malaise/fatigue and weight loss. HENT: Negative for ear pain, hearing loss, nosebleeds, sore throat and tinnitus. Eyes: Negative for blurred vision, double vision, photophobia and pain. Respiratory: Negative for cough, hemoptysis, sputum production, shortness of breath and wheezing. Cardiovascular: Negative for palpitations, orthopnea, claudication, leg swelling and PND. Gastrointestinal: Negative for abdominal pain, blood in stool, constipation, diarrhea, heartburn, melena, nausea and vomiting. Genitourinary: Negative for dysuria, flank pain, frequency, hematuria and urgency. Musculoskeletal: Negative for back pain, falls, joint pain, myalgias and neck pain. Skin: Negative for itching and rash. Neurological: Negative for dizziness, tingling, tremors, sensory change, focal weakness, weakness and headaches. This 1st episode of seizure-like activity. Endo/Heme/Allergies: Does not bruise/bleed easily. Psychiatric/Behavioral: Negative for depression. The patient is not nervous/anxious. Medications:      Allergies: No Known Allergies    Current Meds:    levETIRAcetam  500 mg Oral BID     PRN Meds:     acetaminophen 650 mg Oral Q4H PRN   LORazepam 2 mg 12/04/18 1531  Last data filed at 12/03/18 2003   Gross per 24 hour   Intake                0 ml   Output              200 ml   Net             -200 ml       General Appearance:    Alert, cooperative, no distress, appears stated age   Head:    Normocephalic, without obvious abnormality, atraumatic   Eyes:    PERRL, conjunctiva/corneas clear, EOM's intact        Ears:    Normal external ear canals, both ears   Nose:   Nares normal, septum midline, mucosa normal, no drainage    or sinus tenderness   Throat:   Lips, mucosa, and tongue normal; teeth and gums normal   Neck:   Supple, symmetrical, trachea midline, no adenopathy;        thyroid:  No enlargement/tenderness/nodules; no carotid    bruit or JVD   Back:     Symmetric, no curvature, ROM normal, no CVA tenderness   Lungs:     Clear to auscultation bilaterally, respirations unlabored   Chest wall:    No tenderness or deformity   Heart:    Regular rate and rhythm, S1 and S2 normal, no murmur, rub   or gallop   Abdomen:     Soft, non-tender, bowel sounds active all four quadrants,     no masses, no organomegaly   Extremities:   Extremities normal, atraumatic, no cyanosis or edema   Pulses:   2+ and symmetric all extremities   Skin:   Skin color, texture, turgor normal, no rashes or lesions   Lymph nodes:   Cervical, supraclavicular, and axillary nodes normal   Neurologic:   CNII-XII intact. Normal strength, sensation and reflexes       throughout       Assessment:     Primary Problem  Observed seizure-like activity [facility])     Active Hospital Problems    Diagnosis Date Noted    Observed seizure-like activity ([facility] [address] [R56.9] 12/02/2018     Past Medical History:   Diagnosis Date    Adverse reaction to birth control pills 2015    vomiting with ortho-cyclen, every day X2 weeks    Back pain     with periods    Dysmenorrhea     Menorrhagia         Consultations:     IP CONSULT TO INTERNAL MEDICINE  IP CONSULT TO NEUROLOGY    Plan:     1. Discharge home today  2.  Alethea Christy

## 2018-12-04 NOTE — PROGRESS NOTES
The pt was discharged to home. The discharge papers were given and reviewed and given. She was escorted to the front door in stable condition.

## 2019-05-18 ENCOUNTER — HOSPITAL ENCOUNTER (EMERGENCY)
Age: 24
Discharge: HOME | End: 2019-05-18
Payer: COMMERCIAL

## 2019-05-18 VITALS
OXYGEN SATURATION: 97 % | HEART RATE: 86 BPM | SYSTOLIC BLOOD PRESSURE: 117 MMHG | BODY MASS INDEX: 25.01 KG/M2 | TEMPERATURE: 98.5 F | WEIGHT: 165 LBS | DIASTOLIC BLOOD PRESSURE: 78 MMHG | RESPIRATION RATE: 16 BRPM | HEIGHT: 68 IN

## 2019-05-18 DIAGNOSIS — R56.9 SEIZURE (HCC): Primary | ICD-10-CM

## 2019-05-18 LAB
ABSOLUTE EOS #: 0.06 K/UL (ref 0–0.44)
ABSOLUTE IMMATURE GRANULOCYTE: 0.01 K/UL (ref 0–0.3)
ABSOLUTE LYMPH #: 0.92 K/UL (ref 1.1–3.7)
ABSOLUTE MONO #: 0.39 K/UL (ref 0.1–1.2)
ANION GAP SERPL CALCULATED.3IONS-SCNC: 9 MMOL/L (ref 9–17)
APPEARANCE: CLEAR
BASOPHILS # BLD: 1 % (ref 0–2)
BASOPHILS ABSOLUTE: 0.04 K/UL (ref 0–0.2)
BILIRUBIN URINE: NEGATIVE
BILIRUBIN, POC: NEGATIVE
BLOOD URINE, POC: NEGATIVE
BUN BLDV-MCNC: 10 MG/DL (ref 6–20)
BUN/CREAT BLD: 16 (ref 9–20)
CALCIUM SERPL-MCNC: 9.1 MG/DL (ref 8.6–10.4)
CHLORIDE BLD-SCNC: 103 MMOL/L (ref 98–107)
CHP ED QC CHECK: YES
CHP ED QC CHECK: YES
CLARITY, POC: CLEAR
CO2: 23 MMOL/L (ref 20–31)
COLOR, POC: YELLOW
COLOR: YELLOW
COMMENT UA: ABNORMAL
CREAT SERPL-MCNC: 0.64 MG/DL (ref 0.5–0.9)
DIFFERENTIAL TYPE: ABNORMAL
EOSINOPHILS RELATIVE PERCENT: 1 % (ref 1–4)
GFR AFRICAN AMERICAN: >60 ML/MIN
GFR NON-AFRICAN AMERICAN: >60 ML/MIN
GFR SERPL CREATININE-BSD FRML MDRD: NORMAL ML/MIN/{1.73_M2}
GFR SERPL CREATININE-BSD FRML MDRD: NORMAL ML/MIN/{1.73_M2}
GLUCOSE BLD-MCNC: 98 MG/DL (ref 70–99)
GLUCOSE URINE, POC: NEGATIVE
GLUCOSE URINE: NEGATIVE
HCT VFR BLD CALC: 43 % (ref 36.3–47.1)
HEMOGLOBIN: 13.8 G/DL (ref 11.9–15.1)
IMMATURE GRANULOCYTES: 0 %
KEPPRA: 7 UG/ML
KETONES, POC: NEGATIVE
KETONES, URINE: NEGATIVE
LEUKOCYTE EST, POC: NEGATIVE
LEUKOCYTE ESTERASE, URINE: NEGATIVE
LYMPHOCYTES # BLD: 14 % (ref 24–43)
MCH RBC QN AUTO: 27.7 PG (ref 25.2–33.5)
MCHC RBC AUTO-ENTMCNC: 32.1 G/DL (ref 28.4–34.8)
MCV RBC AUTO: 86.3 FL (ref 82.6–102.9)
MONOCYTES # BLD: 6 % (ref 3–12)
NITRITE, POC: NEGATIVE
NITRITE, URINE: NEGATIVE
NRBC AUTOMATED: 0 PER 100 WBC
PDW BLD-RTO: 12.8 % (ref 11.8–14.4)
PH UA: 8.5 (ref 5–8)
PH, POC: 8.5
PLATELET # BLD: 281 K/UL (ref 138–453)
PLATELET ESTIMATE: ABNORMAL
PMV BLD AUTO: 9.7 FL (ref 8.1–13.5)
POTASSIUM SERPL-SCNC: 4.1 MMOL/L (ref 3.7–5.3)
PREGNANCY TEST URINE, POC: NEGATIVE
PROTEIN UA: NEGATIVE
PROTEIN, POC: NORMAL
RBC # BLD: 4.98 M/UL (ref 3.95–5.11)
RBC # BLD: ABNORMAL 10*6/UL
SEG NEUTROPHILS: 78 % (ref 36–65)
SEGMENTED NEUTROPHILS ABSOLUTE COUNT: 4.96 K/UL (ref 1.5–8.1)
SODIUM BLD-SCNC: 135 MMOL/L (ref 135–144)
SPECIFIC GRAVITY UA: 1.01 (ref 1–1.03)
SPECIFIC GRAVITY, POC: 1.01
TURBIDITY: CLEAR
URINE HGB: NEGATIVE
UROBILINOGEN, POC: 0.2
UROBILINOGEN, URINE: NORMAL
WBC # BLD: 6.4 K/UL (ref 3.5–11.3)
WBC # BLD: ABNORMAL 10*3/UL

## 2019-05-18 PROCEDURE — 96374 THER/PROPH/DIAG INJ IV PUSH: CPT

## 2019-05-18 PROCEDURE — 6360000002 HC RX W HCPCS: Performed by: NURSE PRACTITIONER

## 2019-05-18 PROCEDURE — 85025 COMPLETE CBC W/AUTO DIFF WBC: CPT

## 2019-05-18 PROCEDURE — 84703 CHORIONIC GONADOTROPIN ASSAY: CPT

## 2019-05-18 PROCEDURE — 81003 URINALYSIS AUTO W/O SCOPE: CPT

## 2019-05-18 PROCEDURE — 80048 BASIC METABOLIC PNL TOTAL CA: CPT

## 2019-05-18 PROCEDURE — 2580000003 HC RX 258: Performed by: NURSE PRACTITIONER

## 2019-05-18 PROCEDURE — 99284 EMERGENCY DEPT VISIT MOD MDM: CPT

## 2019-05-18 PROCEDURE — 80177 DRUG SCRN QUAN LEVETIRACETAM: CPT

## 2019-05-18 RX ORDER — NAPROXEN 250 MG/1
1 TABLET ORAL
COMMUNITY
End: 2020-03-06

## 2019-05-18 RX ORDER — LEVETIRACETAM 500 MG/1
500 TABLET ORAL 2 TIMES DAILY
Qty: 60 TABLET | Refills: 0 | Status: SHIPPED | OUTPATIENT
Start: 2019-05-18 | End: 2019-09-19 | Stop reason: SDUPTHER

## 2019-05-18 RX ADMIN — LEVETIRACETAM 1000 MG: 100 INJECTION, SOLUTION INTRAVENOUS at 16:07

## 2019-05-18 ASSESSMENT — PAIN SCALES - GENERAL: PAINLEVEL_OUTOF10: 3

## 2019-05-18 ASSESSMENT — PAIN DESCRIPTION - FREQUENCY: FREQUENCY: CONTINUOUS

## 2019-05-18 ASSESSMENT — PAIN DESCRIPTION - DESCRIPTORS: DESCRIPTORS: TENDER

## 2019-05-18 ASSESSMENT — PAIN DESCRIPTION - LOCATION: LOCATION: LEG;NECK

## 2019-05-18 NOTE — ED PROVIDER NOTES
[address] [place] [address] ED  eMERGENCY dEPARTMENT eNCOUnter      Pt Name: Luis Avila  MRN: [id number]  Elizabethgfdeirdre 1995  Date of evaluation: 5/18/2019  Provider: Paulina Olivia       Chief Complaint   Patient presents with    Seizures     onset today         HISTORY OFPRESENT ILLNESS  (Location/Symptom, Timing/Onset, Context/Setting, Quality, Duration, Modifying Factors, Severity.)   Luis Avila is a 26 y.o. female who presents to the emergency department for evaluation of seizure today. Patient was dropped off to the emergency department by her father. Patient states that she had witnessed seizure by her boyfriend around 1:00 today while she was lying in bed. She has a history of seizures. She states her last seizure was last December. She is supposed to be taking Keppra 500 mg twice daily for seizures but states for the past week she has only been taking her Keppra once a day due to cost issues. Patient states she feels extremely fatigued upon arrival.  She states she did bite her tongue. Tongue pain as a 3 out of 10. Patient states she follows up with Dr. Salvatore Valentino with neurology to schedule an appointment in June. She arrived her brain last December which showed questionable Chiari I malformation. Nursing Notes were reviewed. PASTMEDICAL HISTORY     Past Medical History:   Diagnosis Date    Adverse reaction to birth control pills 2015    vomiting with ortho-cyclen, every day X2 weeks    Back pain     with periods    Dysmenorrhea     Menorrhagia          SURGICAL HISTORY       Past Surgical History:   Procedure Laterality Date    TYMPANOSTOMY TUBE PLACEMENT  age 2         CURRENT MEDICATIONS     Previous Medications    LEVETIRACETAM (KEPPRA) 500 MG TABLET    Take 1 tablet by mouth 2 times daily    NAPROXEN (NAPROSYN) 250 MG TABLET    Take 1 tablet by mouth every 6-8 hours as needed        ALLERGIES     Patient has no known allergies.     FAMILY HISTORY       Family level 5)     ED Triage Vitals [05/18/19 1504]   BP Temp Temp Source Pulse Resp SpO2 Height Weight   117/78 98.5 °F (36.9 °C) Oral 86 16 97 % 5' 8\" (1.727 m) 165 lb (74.8 kg)       Physical Exam   Constitutional: She is oriented to person, place, and time. She appears well-developed and well-nourished. HENT:   Head: Normocephalic and atraumatic. Right Ear: Hearing and external ear normal.   Left Ear: Hearing and external ear normal.   Nose: Nose normal.   Mouth/Throat: Uvula is midline, oropharynx is clear and moist and mucous membranes are normal. Lacerations present. Eyes: Pupils are equal, round, and reactive to light. Conjunctivae, EOM and lids are normal.   Pupils are 2 mm and equally reactive to light. Neck: Normal range of motion and full passive range of motion without pain. Neck supple. Cardiovascular: Normal rate, regular rhythm, S1 normal, S2 normal, normal heart sounds, intact distal pulses and normal pulses. Pulmonary/Chest: Effort normal and breath sounds normal.   Abdominal: Soft. Normal appearance and bowel sounds are normal. There is no tenderness. Musculoskeletal: Normal range of motion. Neurological: She is alert and oriented to person, place, and time. She has normal strength. No cranial nerve deficit or sensory deficit. GCS eye subscore is 4. GCS verbal subscore is 5. GCS motor subscore is 6. Skin: Skin is warm, dry and intact. Capillary refill takes less than 2 seconds. Psychiatric: She has a normal mood and affect.  Her behavior is normal. Judgment and thought content normal.         DIAGNOSTIC RESULTS     EKG:All EKG's are interpreted by the Emergency Department Physician who either signs or Co-signs this chart in the absence of a cardiologist.        RADIOLOGY:   Non-plain film images such as CT, Ultrasound and MRI are read by theradiologist. Plain radiographic images are visualized and preliminarily interpreted by the emergency physician with the below findings:        Interpretation per the Radiologist below, if available at the time of this note:    No orders to display         EDBEDSIDE ULTRASOUND:   Performed by Cassidy Blood - dalila    LABS:  [unfilled]    All other labs were within normal range or not returned as of this dictation. EMERGENCY DEPARTMENT COURSE andDIFFERENTIAL DIAGNOSIS/MDM:   Patient was evaluated in conjunction with attending physician. Urine pregnancy negative. Urinalysis does not show infection. Labs reviewed. Keppra level 7. The patient was given 1 g of [address] [facility] emergency department. She was observed for several hours and did not have any more seizure activity. She is alert and oriented ×3. No neurological deficits. Seizure precautions provided. Patient has had issues with the cost of the Keppra medication. She was instructed to follow-up with her neurologist on Monday. Vitals:    Vitals:    05/18/19 1504   BP: 117/78   Pulse: 86   Resp: 16   Temp: 98.5 °F (36.9 °C)   TempSrc: Oral   SpO2: 97%   Weight: 165 lb (74.8 kg)   Height: 5' 8\" (1.727 m)         CONSULTS:  None    PROCEDURES:  Procedures    FINAL IMPRESSION      1. Seizure [facility])          DISPOSITION/PLAN   DISPOSITION Decision To Discharge 05/18/2019 05:20:56 PM      PATIENT REFERRED TO:     Follow-up with your neurologist on Monday.         [facility] ED  [address] [facility]  [phone]    As needed      DISCHARGE MEDICATIONS:     New Prescriptions    LEVETIRACETAM (KEPPRA) 500 MG TABLET    Take 1 tablet by mouth 2 times daily     Electronically signed by KATELNI Navarrete 5/18/2019 at 5:28 PM           KATELIN Navarrete CNP  05/18/19 5544

## 2019-05-18 NOTE — ED NOTES
Pt to room 18 via w/c with c/o seizure at approximately 1300 today. Pt presents to ED A&Ox4. Pt reports hx of seizures, Rx'd PO Keppra 500mg BID, states \"I'm running low so I've been trying to stretch it out as best I can\" and \"I have only been taking it like once a day because they keep marking the price up on me and my insurance is trying to say they won't cover it\". Pt reports she was in bed sleeping at home when she had a witnessed seizure per her boyfriend who reported to her that she started choking in her sleep, so he rolled her over in the bed, and then pt had a moderate amount of saliva and blood that had came out of her mouth once seizure had finished. Pt reports her boyfriend did not report to her how long the seizure lasted. Pt denies any urinary or stool incontinence during seizure. Pt has small laceration noted to the tip of the right side of her tongue, no active bleeding. Pt reports pain in BLE and posterior neck, denies any injuries during the seizure. Pt denies any recent N/V or fevers. Abd soft, flat, BS active x 4 quads. Respirations even and non labored. Skin PWD, MMM. NAD noted. Pt requesting work note, states \"I called off because of the seizure and they're telling me I need a note to come back\". Francy Jackson NP notified.       Billy Khanna RN  05/18/19 5960

## 2019-05-20 LAB — HCG, PREGNANCY URINE (POC): NEGATIVE

## 2019-09-19 ENCOUNTER — HOSPITAL ENCOUNTER (OUTPATIENT)
Age: 24
Setting detail: SPECIMEN
Discharge: HOME | End: 2019-09-19
Payer: COMMERCIAL

## 2019-09-19 ENCOUNTER — OFFICE VISIT (OUTPATIENT)
Dept: OBGYN CLINIC | Age: 24
End: 2019-09-19
Payer: COMMERCIAL

## 2019-09-19 VITALS
SYSTOLIC BLOOD PRESSURE: 108 MMHG | HEIGHT: 67 IN | DIASTOLIC BLOOD PRESSURE: 76 MMHG | BODY MASS INDEX: 25.93 KG/M2 | WEIGHT: 165.2 LBS

## 2019-09-19 DIAGNOSIS — Z01.419 ENCOUNTER FOR GYNECOLOGICAL EXAMINATION: Primary | ICD-10-CM

## 2019-09-19 PROCEDURE — 99385 PREV VISIT NEW AGE 18-39: CPT | Performed by: NURSE PRACTITIONER

## 2019-09-19 ASSESSMENT — ENCOUNTER SYMPTOMS
ABDOMINAL DISTENTION: 0
BACK PAIN: 0
SHORTNESS OF BREATH: 0
DIARRHEA: 0
COUGH: 0
ABDOMINAL PAIN: 0
CONSTIPATION: 0

## 2019-09-19 NOTE — PROGRESS NOTES
Alcohol use: No        Subjective:      Review of Systems   Constitutional: Negative for appetite change and fatigue. HENT: Negative for congestion and hearing loss. Eyes: Negative for visual disturbance. Respiratory: Negative for cough and shortness of breath. Cardiovascular: Negative for chest pain and palpitations. Gastrointestinal: Negative for abdominal distention, abdominal pain, constipation and diarrhea. Genitourinary: Negative for flank pain, frequency, menstrual problem, pelvic pain and vaginal discharge. Musculoskeletal: Negative for back pain. Neurological: Negative for syncope and headaches. Psychiatric/Behavioral: Negative for behavioral problems. Objective:     /76 (Site: Right Upper Arm, Position: Sitting, Cuff Size: Medium Adult)   Ht 5' 7\" (1.702 m)   Wt 165 lb 3.2 oz (74.9 kg)   LMP 09/17/2019   Breastfeeding? No   BMI 25.87 kg/m²   Physical Exam   Constitutional: She is oriented to person, place, and time. She appears well-developed and well-nourished. Eyes: Pupils are equal, round, and reactive to light. Neck: No tracheal deviation present. No thyromegaly present. Cardiovascular: Normal rate, regular rhythm and normal heart sounds. Pulmonary/Chest: Effort normal and breath sounds normal. No respiratory distress. Right breast exhibits no inverted nipple. Left breast exhibits no inverted nipple, no mass, no nipple discharge, no skin change and no tenderness. No breast tenderness, discharge or bleeding. Breasts are symmetrical.   Abdominal: Soft. Bowel sounds are normal. She exhibits no distension and no mass. There is no tenderness. There is no CVA tenderness. Hernia confirmed negative in the right inguinal area and confirmed negative in the left inguinal area. Genitourinary: No breast tenderness, discharge or bleeding. There is no rash or lesion on the right labia. There is no rash or lesion on the left labia.  Uterus is not deviated, not enlarged and

## 2019-09-27 LAB — CYTOLOGY REPORT: NORMAL

## 2019-10-07 ENCOUNTER — TELEPHONE (OUTPATIENT)
Dept: OBGYN CLINIC | Age: 24
End: 2019-10-07

## 2019-10-07 DIAGNOSIS — Z13.79 GENETIC TESTING: Primary | ICD-10-CM

## 2019-10-07 DIAGNOSIS — Z30.09 FAMILY PLANNING COUNSELING: ICD-10-CM

## 2019-10-31 ENCOUNTER — HOSPITAL ENCOUNTER (EMERGENCY)
Age: 24
Discharge: HOME | End: 2019-10-31
Attending: EMERGENCY MEDICINE
Payer: COMMERCIAL

## 2019-10-31 ENCOUNTER — APPOINTMENT (OUTPATIENT)
Dept: CT IMAGING | Age: 24
End: 2019-10-31
Payer: COMMERCIAL

## 2019-10-31 VITALS
HEIGHT: 68 IN | BODY MASS INDEX: 25.64 KG/M2 | WEIGHT: 169.2 LBS | TEMPERATURE: 98.8 F | DIASTOLIC BLOOD PRESSURE: 64 MMHG | HEART RATE: 68 BPM | RESPIRATION RATE: 16 BRPM | OXYGEN SATURATION: 100 % | SYSTOLIC BLOOD PRESSURE: 110 MMHG

## 2019-10-31 DIAGNOSIS — N30.01 ACUTE CYSTITIS WITH HEMATURIA: Primary | ICD-10-CM

## 2019-10-31 LAB
-: ABNORMAL
ABSOLUTE EOS #: 0.23 K/UL (ref 0–0.44)
ABSOLUTE IMMATURE GRANULOCYTE: 0.03 K/UL (ref 0–0.3)
ABSOLUTE LYMPH #: 1.76 K/UL (ref 1.1–3.7)
ABSOLUTE MONO #: 0.44 K/UL (ref 0.1–1.2)
AMORPHOUS: ABNORMAL
ANION GAP SERPL CALCULATED.3IONS-SCNC: 9 MMOL/L (ref 9–17)
BACTERIA: ABNORMAL
BASOPHILS # BLD: 1 % (ref 0–2)
BASOPHILS ABSOLUTE: 0.05 K/UL (ref 0–0.2)
BILIRUBIN URINE: NEGATIVE
BUN BLDV-MCNC: 10 MG/DL (ref 6–20)
BUN/CREAT BLD: 13 (ref 9–20)
CALCIUM SERPL-MCNC: 9.2 MG/DL (ref 8.6–10.4)
CASTS UA: ABNORMAL /LPF
CHLORIDE BLD-SCNC: 101 MMOL/L (ref 98–107)
CHP ED QC CHECK: NORMAL
CO2: 26 MMOL/L (ref 20–31)
COLOR: YELLOW
COMMENT UA: ABNORMAL
CREAT SERPL-MCNC: 0.75 MG/DL (ref 0.5–0.9)
CRYSTALS, UA: ABNORMAL /HPF
DIFFERENTIAL TYPE: NORMAL
EOSINOPHILS RELATIVE PERCENT: 3 % (ref 1–4)
EPITHELIAL CELLS UA: ABNORMAL /HPF (ref 0–5)
GFR AFRICAN AMERICAN: >60 ML/MIN
GFR NON-AFRICAN AMERICAN: >60 ML/MIN
GFR SERPL CREATININE-BSD FRML MDRD: NORMAL ML/MIN/{1.73_M2}
GFR SERPL CREATININE-BSD FRML MDRD: NORMAL ML/MIN/{1.73_M2}
GLUCOSE BLD-MCNC: 91 MG/DL (ref 70–99)
GLUCOSE URINE: NEGATIVE
HCT VFR BLD CALC: 41.3 % (ref 36.3–47.1)
HEMOGLOBIN: 13.1 G/DL (ref 11.9–15.1)
IMMATURE GRANULOCYTES: 0 %
KETONES, URINE: NEGATIVE
LEUKOCYTE ESTERASE, URINE: ABNORMAL
LYMPHOCYTES # BLD: 26 % (ref 24–43)
MCH RBC QN AUTO: 27.7 PG (ref 25.2–33.5)
MCHC RBC AUTO-ENTMCNC: 31.7 G/DL (ref 28.4–34.8)
MCV RBC AUTO: 87.3 FL (ref 82.6–102.9)
MONOCYTES # BLD: 6 % (ref 3–12)
MUCUS: ABNORMAL
NITRITE, URINE: NEGATIVE
NRBC AUTOMATED: 0 PER 100 WBC
OTHER OBSERVATIONS UA: ABNORMAL
PDW BLD-RTO: 12.6 % (ref 11.8–14.4)
PH UA: 7 (ref 5–8)
PLATELET # BLD: 265 K/UL (ref 138–453)
PLATELET ESTIMATE: NORMAL
PMV BLD AUTO: 9.9 FL (ref 8.1–13.5)
POTASSIUM SERPL-SCNC: 4.4 MMOL/L (ref 3.7–5.3)
PREGNANCY TEST URINE, POC: NORMAL
PROTEIN UA: NEGATIVE
RBC # BLD: 4.73 M/UL (ref 3.95–5.11)
RBC # BLD: NORMAL 10*6/UL
RBC UA: ABNORMAL /HPF (ref 0–2)
RENAL EPITHELIAL, UA: ABNORMAL /HPF
SEG NEUTROPHILS: 64 % (ref 36–65)
SEGMENTED NEUTROPHILS ABSOLUTE COUNT: 4.34 K/UL (ref 1.5–8.1)
SODIUM BLD-SCNC: 136 MMOL/L (ref 135–144)
SPECIFIC GRAVITY UA: 1.01 (ref 1–1.03)
TRICHOMONAS: ABNORMAL
TURBIDITY: ABNORMAL
URINE HGB: ABNORMAL
UROBILINOGEN, URINE: NORMAL
WBC # BLD: 6.9 K/UL (ref 3.5–11.3)
WBC # BLD: NORMAL 10*3/UL
WBC UA: ABNORMAL /HPF (ref 0–5)
YEAST: ABNORMAL

## 2019-10-31 PROCEDURE — 80048 BASIC METABOLIC PNL TOTAL CA: CPT

## 2019-10-31 PROCEDURE — 81001 URINALYSIS AUTO W/SCOPE: CPT

## 2019-10-31 PROCEDURE — 85025 COMPLETE CBC W/AUTO DIFF WBC: CPT

## 2019-10-31 PROCEDURE — 81025 URINE PREGNANCY TEST: CPT

## 2019-10-31 PROCEDURE — 6370000000 HC RX 637 (ALT 250 FOR IP): Performed by: EMERGENCY MEDICINE

## 2019-10-31 PROCEDURE — 74176 CT ABD & PELVIS W/O CONTRAST: CPT

## 2019-10-31 PROCEDURE — 99284 EMERGENCY DEPT VISIT MOD MDM: CPT

## 2019-10-31 RX ORDER — CEPHALEXIN 500 MG/1
500 CAPSULE ORAL 2 TIMES DAILY
Qty: 10 CAPSULE | Refills: 0 | Status: SHIPPED | OUTPATIENT
Start: 2019-10-31 | End: 2019-11-05

## 2019-10-31 RX ORDER — ONDANSETRON 4 MG/1
4 TABLET, ORALLY DISINTEGRATING ORAL 3 TIMES DAILY PRN
Qty: 12 TABLET | Refills: 0 | Status: SHIPPED | OUTPATIENT
Start: 2019-10-31 | End: 2020-03-09

## 2019-10-31 RX ORDER — PHENAZOPYRIDINE HYDROCHLORIDE 100 MG/1
100 TABLET, FILM COATED ORAL 3 TIMES DAILY PRN
Qty: 9 TABLET | Refills: 0 | Status: SHIPPED | OUTPATIENT
Start: 2019-10-31 | End: 2019-11-03

## 2019-10-31 RX ORDER — PHENAZOPYRIDINE HYDROCHLORIDE 200 MG/1
200 TABLET, FILM COATED ORAL
Status: DISCONTINUED | OUTPATIENT
Start: 2019-10-31 | End: 2019-10-31 | Stop reason: HOSPADM

## 2019-10-31 RX ADMIN — PHENAZOPYRIDINE HYDROCHLORIDE 200 MG: 200 TABLET ORAL at 18:51

## 2019-10-31 ASSESSMENT — PAIN DESCRIPTION - FREQUENCY: FREQUENCY: INTERMITTENT

## 2019-10-31 ASSESSMENT — PAIN DESCRIPTION - DESCRIPTORS: DESCRIPTORS: ACHING;DISCOMFORT

## 2019-10-31 ASSESSMENT — PAIN DESCRIPTION - ORIENTATION: ORIENTATION: LEFT

## 2019-10-31 ASSESSMENT — PAIN SCALES - GENERAL: PAINLEVEL_OUTOF10: 3

## 2019-10-31 ASSESSMENT — PAIN DESCRIPTION - LOCATION: LOCATION: FLANK

## 2019-10-31 ASSESSMENT — PAIN DESCRIPTION - PROGRESSION: CLINICAL_PROGRESSION: GRADUALLY WORSENING

## 2019-10-31 ASSESSMENT — PAIN DESCRIPTION - PAIN TYPE: TYPE: ACUTE PAIN

## 2019-11-01 LAB — HCG, PREGNANCY URINE (POC): NEGATIVE

## 2019-12-07 ENCOUNTER — APPOINTMENT (OUTPATIENT)
Dept: GENERAL RADIOLOGY | Age: 24
End: 2019-12-07
Payer: COMMERCIAL

## 2019-12-07 ENCOUNTER — HOSPITAL ENCOUNTER (EMERGENCY)
Age: 24
Discharge: HOME | End: 2019-12-07
Attending: EMERGENCY MEDICINE
Payer: COMMERCIAL

## 2019-12-07 VITALS
BODY MASS INDEX: 25.11 KG/M2 | HEART RATE: 81 BPM | WEIGHT: 160 LBS | SYSTOLIC BLOOD PRESSURE: 114 MMHG | DIASTOLIC BLOOD PRESSURE: 66 MMHG | HEIGHT: 67 IN | RESPIRATION RATE: 16 BRPM | TEMPERATURE: 99 F | OXYGEN SATURATION: 98 %

## 2019-12-07 DIAGNOSIS — S60.221A CONTUSION OF RIGHT HAND, INITIAL ENCOUNTER: Primary | ICD-10-CM

## 2019-12-07 PROCEDURE — 73130 X-RAY EXAM OF HAND: CPT

## 2019-12-07 PROCEDURE — 99283 EMERGENCY DEPT VISIT LOW MDM: CPT

## 2019-12-07 RX ORDER — IBUPROFEN 800 MG/1
800 TABLET ORAL EVERY 6 HOURS PRN
Qty: 21 TABLET | Refills: 0 | Status: SHIPPED | OUTPATIENT
Start: 2019-12-07 | End: 2020-03-06

## 2019-12-07 ASSESSMENT — PAIN DESCRIPTION - DESCRIPTORS: DESCRIPTORS: ACHING;THROBBING

## 2019-12-07 ASSESSMENT — ENCOUNTER SYMPTOMS
COUGH: 0
DIARRHEA: 0
VOMITING: 0
SORE THROAT: 0
ABDOMINAL PAIN: 0
CONSTIPATION: 0
SINUS PRESSURE: 0
SHORTNESS OF BREATH: 0
RHINORRHEA: 0
COLOR CHANGE: 0
NAUSEA: 0
WHEEZING: 0

## 2019-12-07 ASSESSMENT — PAIN DESCRIPTION - ORIENTATION: ORIENTATION: RIGHT

## 2019-12-07 ASSESSMENT — PAIN SCALES - GENERAL: PAINLEVEL_OUTOF10: 10

## 2019-12-07 ASSESSMENT — PAIN DESCRIPTION - LOCATION: LOCATION: HAND

## 2019-12-07 ASSESSMENT — PAIN DESCRIPTION - PAIN TYPE: TYPE: ACUTE PAIN

## 2020-02-20 ENCOUNTER — TELEPHONE (OUTPATIENT)
Dept: OBGYN CLINIC | Age: 25
End: 2020-02-20

## 2020-02-20 ENCOUNTER — HOSPITAL ENCOUNTER (OUTPATIENT)
Age: 25
Discharge: HOME | End: 2020-02-20
Payer: COMMERCIAL

## 2020-02-20 LAB — HCG QUANTITATIVE: 649 IU/L

## 2020-02-20 PROCEDURE — 36415 COLL VENOUS BLD VENIPUNCTURE: CPT

## 2020-02-20 PROCEDURE — 84702 CHORIONIC GONADOTROPIN TEST: CPT

## 2020-02-20 NOTE — TELEPHONE ENCOUNTER
23 y/o  Planning pregnancy since 08/2019, x2 UPT positive last night and was instructed to call for serum pregnancy test.     (LMP 01/18/2020 [facility] 10/24/20)

## 2020-03-06 ENCOUNTER — HOSPITAL ENCOUNTER (EMERGENCY)
Age: 25
Discharge: HOME | End: 2020-03-06
Attending: EMERGENCY MEDICINE
Payer: COMMERCIAL

## 2020-03-06 ENCOUNTER — APPOINTMENT (OUTPATIENT)
Dept: ULTRASOUND IMAGING | Age: 25
End: 2020-03-06
Payer: COMMERCIAL

## 2020-03-06 VITALS
DIASTOLIC BLOOD PRESSURE: 77 MMHG | WEIGHT: 155 LBS | TEMPERATURE: 99 F | HEIGHT: 68 IN | BODY MASS INDEX: 23.49 KG/M2 | RESPIRATION RATE: 16 BRPM | SYSTOLIC BLOOD PRESSURE: 138 MMHG | OXYGEN SATURATION: 99 % | HEART RATE: 103 BPM

## 2020-03-06 LAB
ABO/RH: NORMAL
ABSOLUTE EOS #: 0.15 K/UL (ref 0–0.44)
ABSOLUTE IMMATURE GRANULOCYTE: 0.02 K/UL (ref 0–0.3)
ABSOLUTE LYMPH #: 1.27 K/UL (ref 1.1–3.7)
ABSOLUTE MONO #: 0.38 K/UL (ref 0.1–1.2)
ANION GAP SERPL CALCULATED.3IONS-SCNC: 13 MMOL/L (ref 9–17)
BASOPHILS # BLD: 1 % (ref 0–2)
BASOPHILS ABSOLUTE: 0.05 K/UL (ref 0–0.2)
BUN BLDV-MCNC: 9 MG/DL (ref 6–20)
BUN/CREAT BLD: 13 (ref 9–20)
CALCIUM SERPL-MCNC: 9.3 MG/DL (ref 8.6–10.4)
CHLORIDE BLD-SCNC: 101 MMOL/L (ref 98–107)
CHP ED QC CHECK: NORMAL
CO2: 22 MMOL/L (ref 20–31)
CREAT SERPL-MCNC: 0.72 MG/DL (ref 0.5–0.9)
DIFFERENTIAL TYPE: ABNORMAL
EOSINOPHILS RELATIVE PERCENT: 3 % (ref 1–4)
GFR AFRICAN AMERICAN: >60 ML/MIN
GFR NON-AFRICAN AMERICAN: >60 ML/MIN
GFR SERPL CREATININE-BSD FRML MDRD: NORMAL ML/MIN/{1.73_M2}
GFR SERPL CREATININE-BSD FRML MDRD: NORMAL ML/MIN/{1.73_M2}
GLUCOSE BLD-MCNC: 88 MG/DL (ref 70–99)
HCG QUANTITATIVE: 3049 IU/L
HCT VFR BLD CALC: 42.8 % (ref 36.3–47.1)
HEMOGLOBIN: 13.3 G/DL (ref 11.9–15.1)
IMMATURE GRANULOCYTES: 0 %
LYMPHOCYTES # BLD: 21 % (ref 24–43)
MCH RBC QN AUTO: 27.5 PG (ref 25.2–33.5)
MCHC RBC AUTO-ENTMCNC: 31.1 G/DL (ref 28.4–34.8)
MCV RBC AUTO: 88.4 FL (ref 82.6–102.9)
MONOCYTES # BLD: 6 % (ref 3–12)
NRBC AUTOMATED: 0 PER 100 WBC
PDW BLD-RTO: 12.7 % (ref 11.8–14.4)
PLATELET # BLD: 259 K/UL (ref 138–453)
PLATELET ESTIMATE: ABNORMAL
PMV BLD AUTO: 10 FL (ref 8.1–13.5)
POTASSIUM SERPL-SCNC: 4.5 MMOL/L (ref 3.7–5.3)
PREGNANCY TEST URINE, POC: POSITIVE
RBC # BLD: 4.84 M/UL (ref 3.95–5.11)
RBC # BLD: ABNORMAL 10*6/UL
SEG NEUTROPHILS: 69 % (ref 36–65)
SEGMENTED NEUTROPHILS ABSOLUTE COUNT: 4.1 K/UL (ref 1.5–8.1)
SODIUM BLD-SCNC: 136 MMOL/L (ref 135–144)
WBC # BLD: 6 K/UL (ref 3.5–11.3)
WBC # BLD: ABNORMAL 10*3/UL

## 2020-03-06 PROCEDURE — 86901 BLOOD TYPING SEROLOGIC RH(D): CPT

## 2020-03-06 PROCEDURE — 76801 OB US < 14 WKS SINGLE FETUS: CPT

## 2020-03-06 PROCEDURE — 86900 BLOOD TYPING SEROLOGIC ABO: CPT

## 2020-03-06 PROCEDURE — 81025 URINE PREGNANCY TEST: CPT

## 2020-03-06 PROCEDURE — 76817 TRANSVAGINAL US OBSTETRIC: CPT

## 2020-03-06 PROCEDURE — 99284 EMERGENCY DEPT VISIT MOD MDM: CPT

## 2020-03-06 PROCEDURE — 36415 COLL VENOUS BLD VENIPUNCTURE: CPT

## 2020-03-06 PROCEDURE — 80048 BASIC METABOLIC PNL TOTAL CA: CPT

## 2020-03-06 PROCEDURE — 81003 URINALYSIS AUTO W/O SCOPE: CPT

## 2020-03-06 PROCEDURE — 85025 COMPLETE CBC W/AUTO DIFF WBC: CPT

## 2020-03-06 PROCEDURE — 84702 CHORIONIC GONADOTROPIN TEST: CPT

## 2020-03-06 ASSESSMENT — PAIN DESCRIPTION - ORIENTATION: ORIENTATION: LOWER

## 2020-03-06 ASSESSMENT — PAIN DESCRIPTION - PROGRESSION: CLINICAL_PROGRESSION: NOT CHANGED

## 2020-03-06 ASSESSMENT — PAIN DESCRIPTION - FREQUENCY: FREQUENCY: CONTINUOUS

## 2020-03-06 ASSESSMENT — PAIN DESCRIPTION - LOCATION: LOCATION: ABDOMEN;BACK

## 2020-03-06 ASSESSMENT — PAIN DESCRIPTION - DESCRIPTORS: DESCRIPTORS: CRAMPING

## 2020-03-06 ASSESSMENT — PAIN DESCRIPTION - PAIN TYPE: TYPE: ACUTE PAIN

## 2020-03-06 ASSESSMENT — ENCOUNTER SYMPTOMS: ABDOMINAL PAIN: 1

## 2020-03-06 ASSESSMENT — PAIN SCALES - GENERAL: PAINLEVEL_OUTOF10: 8

## 2020-03-06 ASSESSMENT — PAIN DESCRIPTION - ONSET: ONSET: ON-GOING

## 2020-03-06 NOTE — ED PROVIDER NOTES
is unknown. She indicated that one of her two paternal aunts is . She indicated that the status of her paternal uncle is unknown. SOCIAL HISTORY       Social History     Tobacco Use    Smoking status: Former Smoker     Last attempt to quit: 2015     Years since quittin.2    Smokeless tobacco: Never Used   Substance Use Topics    Alcohol use: No    Drug use: Not Currently     Types: Marijuana     PHYSICAL EXAM     INITIAL VITALS: /77   Pulse 103   Temp 99 °F (37.2 °C) (Oral)   Resp 16   Ht 5' 8\" (1.727 m)   Wt 155 lb (70.3 kg)   LMP 2020   SpO2 99%   BMI 23.57 kg/m²    Physical Exam  Constitutional:       General: She is not in acute distress. Appearance: She is well-developed. HENT:      Head: Normocephalic. Eyes:      Pupils: Pupils are equal, round, and reactive to light. Cardiovascular:      Rate and Rhythm: Normal rate and regular rhythm. Heart sounds: Normal heart sounds. Pulmonary:      Effort: Pulmonary effort is normal. No respiratory distress. Breath sounds: Normal breath sounds. Abdominal:      General: Bowel sounds are normal.      Palpations: Abdomen is soft. Tenderness: There is no abdominal tenderness. Genitourinary:     Comments: Cervix is closed. There is small amount brownish blood in the vaginal vault  Musculoskeletal: Normal range of motion. Skin:     General: Skin is warm and dry. Neurological:      Mental Status: She is alert and oriented to person, place, and time. MEDICAL DECISION MAKING:     Vaginal bleeding with first trimester pregnancy. No ultrasound was ordered prior to ED visit today. Ultrasound shows early gestation. No concern for ectopic based on results. Threatened miscarriage due to bleeding. Patient is Rh+. Discussed return precautions with patient. I recommended outpatient follow-up with OB/GYN.          CRITICAL CARE:       PROCEDURES:    Procedures    DIAGNOSTIC RESULTS   EKG:All EKG's are interpreted by the Emergency Department Physician who either signs or Co-signs this chart in the absence of a cardiologist.        RADIOLOGY:All plain film, CT, MRI, and formal ultrasound images (except ED bedside ultrasound) are read by the radiologist, see reports below, unless otherwisenoted in MDM or here. US OB LESS THAN 14 WEEKS SINGLE OR FIRST GESTATION   Final Result   Single intrauterine gestational sac without identifiable fetal pole at this   time, probably related to early gestation. Normal appearing bilateral ovaries. No abnormal adnexal mass to indicate the   presence of ectopic pregnancy. Short interval follow-up with beta HCG correlation would be recommended to   assess viability. US OB TRANSVAGINAL   Final Result   Single intrauterine gestational sac without identifiable fetal pole at this   time, probably related to early gestation. Normal appearing bilateral ovaries. No abnormal adnexal mass to indicate the   presence of ectopic pregnancy. Short interval follow-up with beta HCG correlation would be recommended to   assess viability. LABS: All lab results were reviewed by myself, and all abnormals are listed below.   Labs Reviewed   CBC WITH AUTO DIFFERENTIAL - Abnormal; Notable for the following components:       Result Value    Seg Neutrophils 69 (*)     Lymphocytes 21 (*)     All other components within normal limits   HCG, QUANTITATIVE, PREGNANCY - Abnormal; Notable for the following components:    hCG Quant 3,049 (*)     All other components within normal limits   POCT URINE PREGNANCY - Normal   BASIC METABOLIC PANEL W/ REFLEX TO MG FOR LOW K   ABO/RH       EMERGENCY DEPARTMENTCOURSE:         Vitals:    Vitals:    03/06/20 1340   BP: 138/77   Pulse: 103   Resp: 16   Temp: 99 °F (37.2 °C)   TempSrc: Oral   SpO2: 99%   Weight: 155 lb (70.3 kg)   Height: 5' 8\" (1.727 m)       The patient was given the following medications while in the emergency department:  No orders of the defined types were placed in this encounter. CONSULTS:  None    FINAL IMPRESSION      1. Vaginal bleeding during pregnancy          DISPOSITION/PLAN   DISPOSITION Decision To Discharge 03/06/2020 03:17:56 PM      PATIENT REFERRED TO:  No follow-up provider specified.   DISCHARGE MEDICATIONS:  Discharge Medication List as of 3/6/2020  3:19 PM        Sun Hyman MD  Attending Emergency Physician                  Magdalena Allred MD  03/06/20 9541

## 2020-03-06 NOTE — LETTER
[facility] ED  [address] [place] [address]  Phone: [phone]             March 6, 2020    Patient: Holly Martinez   YOB: 1995   Date of Visit: 3/6/2020       To Whom It May Concern:    Holly Martinez was seen and treated in our emergency department on 3/6/2020.        Sincerely,             Signature:__________________________________

## 2020-03-07 LAB — HCG, PREGNANCY URINE (POC): POSITIVE

## 2020-03-09 ENCOUNTER — HOSPITAL ENCOUNTER (OUTPATIENT)
Age: 25
Setting detail: SPECIMEN
Discharge: HOME | End: 2020-03-09
Payer: COMMERCIAL

## 2020-03-09 ENCOUNTER — OFFICE VISIT (OUTPATIENT)
Dept: OBGYN CLINIC | Age: 25
End: 2020-03-09
Payer: COMMERCIAL

## 2020-03-09 VITALS
HEIGHT: 68 IN | WEIGHT: 154.8 LBS | BODY MASS INDEX: 23.46 KG/M2 | DIASTOLIC BLOOD PRESSURE: 62 MMHG | SYSTOLIC BLOOD PRESSURE: 110 MMHG

## 2020-03-09 LAB — HCG QUANTITATIVE: 3155 IU/L

## 2020-03-09 PROCEDURE — 99213 OFFICE O/P EST LOW 20 MIN: CPT | Performed by: NURSE PRACTITIONER

## 2020-03-09 NOTE — PROGRESS NOTES
S-  Pt here for FU of vaginal bleeding in early pregnancy. Was seen at [facility] ER on 3/6 for cramping and vaginal bleeding with  And tissue. Pt reports being 7 weeks GA according to her LMP. US performed at this visit shows a sinle intrauterine gestational sac- no fetal pole or FHT identified, Hcg was 3049 at ED visit. Stateses she is sure of her last period. Currently only spotting and mild cramping. O-  VS as charted  Physical Exam  Constitutional:       Appearance: Normal appearance. She is normal weight. HENT:      Head: Normocephalic. Nose: Nose normal.      Mouth/Throat:      Mouth: Mucous membranes are moist.   Eyes:      Conjunctiva/sclera: Conjunctivae normal.   Neck:      Musculoskeletal: Normal range of motion. Cardiovascular:      Rate and Rhythm: Normal rate. Pulmonary:      Effort: Pulmonary effort is normal.   Abdominal:      General: Abdomen is flat. Palpations: Abdomen is soft. Musculoskeletal: Normal range of motion. Skin:     General: Skin is warm. Neurological:      General: No focal deficit present. Mental Status: She is alert and oriented to person, place, and time. Mental status is at baseline. Psychiatric:         Behavior: Behavior normal.         Thought Content: Thought content normal.         Judgment: Judgment normal.             A-  Patient is a 22 y.o. Artice Maxon  seen with total face to face time of 15 minutes. More than 50% of this visit was on counseling and education regarding her    Diagnosis Orders   1. Vaginal bleeding affecting early pregnancy      and her options. She was also counseled on her preventative health maintenance recommendations and follow-up of repeat pelvic US. Refer to plan and assessment. Recent Results (from the past 8736 hour(s))   POCT urine pregnancy    Collection Time: 05/18/19  3:46 PM   Result Value Ref Range    Preg Test, Ur Negative     QC OK?  Yes    Urinalysis, Routine    Collection Time: 05/18/19  3:46 PM   Result Value Ref Range    Color, UA YELLOW YELLOW    Turbidity UA CLEAR CLEAR    Glucose, Ur NEGATIVE NEGATIVE    Bilirubin Urine NEGATIVE NEGATIVE    Ketones, Urine NEGATIVE NEGATIVE    Specific Gravity, UA 1.015 1.005 - 1.030    Urine Hgb NEGATIVE NEGATIVE    pH, UA 8.5 (H) 5.0 - 8.0    Protein, UA NEGATIVE NEGATIVE    Urobilinogen, Urine Normal Normal    Nitrite, Urine NEGATIVE NEGATIVE    Leukocyte Esterase, Urine NEGATIVE NEGATIVE    Urinalysis Comments       Microscopic exam not performed based on chemical results unless requested in original order. POCT Urinalysis no Micro    Collection Time: 05/18/19  3:46 PM   Result Value Ref Range    Color, UA Yellow     Clarity, UA Clear     Glucose, UA POC Negative     Bilirubin, UA Negative     Ketones, UA Negative     Spec Grav, UA 1.015     pH, UA 8.5     Protein, UA POC Trace     Urobilinogen, UA 0.2     Nitrite, UA Negative     Leukocytes, UA Negative     Blood, UA POC Negative     Appearance Clear     QC OK?  Yes    POCT urine pregnancy    Collection Time: 05/18/19  3:46 PM   Result Value Ref Range    HCG, Pregnancy Urine (POC) NEGATIVE NEGATIVE   CBC with DIFF    Collection Time: 05/18/19  4:01 PM   Result Value Ref Range    WBC 6.4 3.5 - 11.3 k/uL    RBC 4.98 3.95 - 5.11 m/uL    Hemoglobin 13.8 11.9 - 15.1 g/dL    Hematocrit 43.0 36.3 - 47.1 %    MCV 86.3 82.6 - 102.9 fL    MCH 27.7 25.2 - 33.5 pg    MCHC 32.1 28.4 - 34.8 g/dL    RDW 12.8 11.8 - 14.4 %    Platelets 405 283 - 953 k/uL    MPV 9.7 8.1 - 13.5 fL    NRBC Automated 0.0 0.0 per 100 WBC    Differential Type NOT REPORTED     Seg Neutrophils 78 (H) 36 - 65 %    Lymphocytes 14 (L) 24 - 43 %    Monocytes 6 3 - 12 %    Eosinophils % 1 1 - 4 %    Basophils 1 0 - 2 %    Immature Granulocytes 0 0 %    Segs Absolute 4.96 1.50 - 8.10 k/uL    Absolute Lymph # 0.92 (L) 1.10 - 3.70 k/uL    Absolute Mono # 0.39 0.10 - 1.20 k/uL    Absolute Eos # 0.06 0.00 - 0.44 k/uL    Basophils Absolute 0.04 0.00 - 0.20 k/uL    Absolute Immature Granulocyte 0.01 0.00 - 0.30 k/uL    WBC Morphology NOT REPORTED     RBC Morphology NOT REPORTED     Platelet Estimate NOT REPORTED    Basic Metabolic Prof    Collection Time: 05/18/19  4:01 PM   Result Value Ref Range    Glucose 98 70 - 99 mg/dL    BUN 10 6 - 20 mg/dL    CREATININE 0.64 0.50 - 0.90 mg/dL    Bun/Cre Ratio 16 9 - 20    Calcium 9.1 8.6 - 10.4 mg/dL    Sodium 135 135 - 144 mmol/L    Potassium 4.1 3.7 - 5.3 mmol/L    Chloride 103 98 - 107 mmol/L    CO2 23 20 - 31 mmol/L    Anion Gap 9 9 - 17 mmol/L    GFR Non-African American >60 >60 mL/min    GFR African American >60 >60 mL/min    GFR Comment          GFR Staging NOT REPORTED    LEVETIRACETAM LEVEL    Collection Time: 05/18/19  4:01 PM   Result Value Ref Range    Levetiracetam Lvl 7 ug/mL   GYN Cytology    Collection Time: 09/19/19 12:53 PM   Result Value Ref Range    Cytology Report       [id number]  [facility]  ANATOMIC [facility]OLOG[facility]  [address] [place] [address]. [facility], [address]  [phone]  Fax: [phone]  GYNECOLOGIC CYTOLOGY REPORT    Patient Name: John Hugo  MR#: [id number]  Specimen #[id number]  Source:  1: Cervical material, (ThinPrep vial, Imaging-assisted review)    Clinical History  Z01.419 Routine gyn exam without abnormal findings  High risk HPV DNA testing is requested if the diagnosis is abnormal    INTERPRETATION    Cervical material, (ThinPrep vial, Imaging-assisted review):  Specimen Adequacy:       Satisfactory for evaluation.       - Endocervical/transformation zone component present. Descriptive Diagnosis:       Negative for intraepithelial lesion or malignancy.           Cytotechnologist:   Rock NERI(ASCP)  **Electronically Signed Out**  ey/9/27/2019     Urinalysis    Collection Time: 10/31/19  5:35 PM   Result Value Ref Range    Color, UA YELLOW YELLOW    Turbidity UA SLIGHTLY CLOUDY (A) CLEAR    Glucose, Ur NEGATIVE NEGATIVE    Bilirubin Urine NEGATIVE NEGATIVE Ketones, Urine NEGATIVE NEGATIVE    Specific Gravity, UA 1.015 1.005 - 1.030    Urine Hgb 3+ (A) NEGATIVE    pH, UA 7.0 5.0 - 8.0    Protein, UA NEGATIVE NEGATIVE    Urobilinogen, Urine Normal Normal    Nitrite, Urine NEGATIVE NEGATIVE    Leukocyte Esterase, Urine SMALL (A) NEGATIVE    Urinalysis Comments NOT REPORTED    Microscopic Urinalysis    Collection Time: 10/31/19  5:35 PM   Result Value Ref Range    -          WBC, UA 10 TO 20 0 - 5 /HPF    RBC, UA 10 TO 20 0 - 2 /HPF    Casts UA NOT REPORTED /LPF    Crystals, UA NOT REPORTED None /HPF    Epithelial Cells UA 20 TO 50 0 - 5 /HPF    Renal Epithelial, UA NOT REPORTED 0 /HPF    Bacteria, UA FEW (A) None    Mucus, UA NOT REPORTED None    Trichomonas, UA NOT REPORTED None    Amorphous, UA NOT REPORTED None    Other Observations UA NOT REPORTED NOT REQ.     Yeast, UA NOT REPORTED None   POCT urine pregnancy    Collection Time: 10/31/19  5:45 PM   Result Value Ref Range    HCG, Pregnancy Urine (POC) NEGATIVE NEGATIVE   POCT urine pregnancy    Collection Time: 10/31/19  5:50 PM   Result Value Ref Range    Preg Test, Ur neg     QC OK? ok    CBC Auto Differential    Collection Time: 10/31/19  6:01 PM   Result Value Ref Range    WBC 6.9 3.5 - 11.3 k/uL    RBC 4.73 3.95 - 5.11 m/uL    Hemoglobin 13.1 11.9 - 15.1 g/dL    Hematocrit 41.3 36.3 - 47.1 %    MCV 87.3 82.6 - 102.9 fL    MCH 27.7 25.2 - 33.5 pg    MCHC 31.7 28.4 - 34.8 g/dL    RDW 12.6 11.8 - 14.4 %    Platelets 701 058 - 107 k/uL    MPV 9.9 8.1 - 13.5 fL    NRBC Automated 0.0 0.0 per 100 WBC    Differential Type NOT REPORTED     Seg Neutrophils 64 36 - 65 %    Lymphocytes 26 24 - 43 %    Monocytes 6 3 - 12 %    Eosinophils % 3 1 - 4 %    Basophils 1 0 - 2 %    Immature Granulocytes 0 0 %    Segs Absolute 4.34 1.50 - 8.10 k/uL    Absolute Lymph # 1.76 1.10 - 3.70 k/uL    Absolute Mono # 0.44 0.10 - 1.20 k/uL    Absolute Eos # 0.23 0.00 - 0.44 k/uL    Basophils Absolute 0.05 0.00 - 0.20 k/uL    Absolute Morphology NOT REPORTED     RBC Morphology NOT REPORTED     Platelet Estimate NOT REPORTED    Basic Metabolic Panel w/ Reflex to MG    Collection Time: 03/06/20  2:30 PM   Result Value Ref Range    Glucose 88 70 - 99 mg/dL    BUN 9 6 - 20 mg/dL    CREATININE 0.72 0.50 - 0.90 mg/dL    Bun/Cre Ratio 13 9 - 20    Calcium 9.3 8.6 - 10.4 mg/dL    Sodium 136 135 - 144 mmol/L    Potassium 4.5 3.7 - 5.3 mmol/L    Chloride 101 98 - 107 mmol/L    CO2 22 20 - 31 mmol/L    Anion Gap 13 9 - 17 mmol/L    GFR Non-African American >60 >60 mL/min    GFR African American >60 >60 mL/min    GFR Comment          GFR Staging NOT REPORTED    ABO/RH    Collection Time: 03/06/20  2:30 PM   Result Value Ref Range    ABO/Rh A POSITIVE    HCG, Total, Quant    Collection Time: 03/06/20  2:30 PM   Result Value Ref Range    hCG Quant 3,049 (H) <5 IU/L       P-  Serial quant BHCG x 3, QOD  Repeat US in 2 weeks  Bleeding/pain precautions reviewed

## 2020-03-11 ENCOUNTER — HOSPITAL ENCOUNTER (OUTPATIENT)
Age: 25
Setting detail: SPECIMEN
Discharge: HOME | End: 2020-03-11
Payer: COMMERCIAL

## 2020-03-11 LAB — HCG QUANTITATIVE: 2740 IU/L

## 2020-03-12 ENCOUNTER — TELEPHONE (OUTPATIENT)
Dept: OBGYN CLINIC | Age: 25
End: 2020-03-12

## 2020-03-17 ENCOUNTER — PROCEDURE VISIT (OUTPATIENT)
Dept: OBGYN CLINIC | Age: 25
End: 2020-03-17
Payer: COMMERCIAL

## 2020-03-17 LAB
ABDOMINAL CIRCUMFERENCE: NORMAL
BIPARIETAL DIAMETER: NORMAL
ESTIMATED FETAL WEIGHT: NORMAL
FEMORAL DIAMETER: NORMAL
HC/AC: NORMAL
HEAD CIRCUMFERENCE: NORMAL

## 2020-03-17 PROCEDURE — 99213 OFFICE O/P EST LOW 20 MIN: CPT | Performed by: OBSTETRICS & GYNECOLOGY

## 2020-03-17 PROCEDURE — 76817 TRANSVAGINAL US OBSTETRIC: CPT | Performed by: OBSTETRICS & GYNECOLOGY

## 2020-03-24 ENCOUNTER — TELEPHONE (OUTPATIENT)
Dept: OBGYN CLINIC | Age: 25
End: 2020-03-24

## 2020-03-24 NOTE — TELEPHONE ENCOUNTER
Pt calling to say that, Juma 10/22/20 she only had \"old blood\", Monday 03/23/20 started to have bright red bleeding again and then switched back to dark red. Pt states she has discussed this with her boyfriend and pt wants to have this over with. Pt requesting D&C.

## 2020-03-24 NOTE — TELEPHONE ENCOUNTER
21 y/o LMOR f/u from her [address] done on 03/17/20 and please call [address].

## 2020-03-25 ENCOUNTER — ANESTHESIA EVENT (OUTPATIENT)
Dept: OPERATING ROOM | Age: 25
End: 2020-03-25
Payer: COMMERCIAL

## 2020-03-25 ENCOUNTER — HOSPITAL ENCOUNTER (OUTPATIENT)
Age: 25
Setting detail: OUTPATIENT SURGERY
Discharge: HOME | End: 2020-03-25
Attending: OBSTETRICS & GYNECOLOGY | Admitting: OBSTETRICS & GYNECOLOGY
Payer: COMMERCIAL

## 2020-03-25 ENCOUNTER — ANESTHESIA (OUTPATIENT)
Dept: OPERATING ROOM | Age: 25
End: 2020-03-25
Payer: COMMERCIAL

## 2020-03-25 VITALS
WEIGHT: 154 LBS | HEART RATE: 86 BPM | HEIGHT: 68 IN | RESPIRATION RATE: 16 BRPM | OXYGEN SATURATION: 99 % | SYSTOLIC BLOOD PRESSURE: 112 MMHG | BODY MASS INDEX: 23.34 KG/M2 | DIASTOLIC BLOOD PRESSURE: 67 MMHG | TEMPERATURE: 97.3 F

## 2020-03-25 VITALS — DIASTOLIC BLOOD PRESSURE: 63 MMHG | TEMPERATURE: 96.5 F | OXYGEN SATURATION: 100 % | SYSTOLIC BLOOD PRESSURE: 116 MMHG

## 2020-03-25 PROBLEM — Z98.890 POST-OPERATIVE STATE: Status: ACTIVE | Noted: 2020-03-25

## 2020-03-25 LAB
ABO/RH: NORMAL
ANTIBODY SCREEN: NEGATIVE
ARM BAND NUMBER: NORMAL
EXPIRATION DATE: NORMAL
HCT VFR BLD CALC: 38.5 % (ref 36.3–47.1)
HEMOGLOBIN: 12.3 G/DL (ref 11.9–15.1)
MCH RBC QN AUTO: 28.5 PG (ref 25.2–33.5)
MCHC RBC AUTO-ENTMCNC: 31.9 G/DL (ref 28.4–34.8)
MCV RBC AUTO: 89.1 FL (ref 82.6–102.9)
NRBC AUTOMATED: 0 PER 100 WBC
PDW BLD-RTO: 12.9 % (ref 11.8–14.4)
PLATELET # BLD: 282 K/UL (ref 138–453)
PMV BLD AUTO: 10.5 FL (ref 8.1–13.5)
RBC # BLD: 4.32 M/UL (ref 3.95–5.11)
WBC # BLD: 6.8 K/UL (ref 3.5–11.3)

## 2020-03-25 PROCEDURE — 2580000003 HC RX 258: Performed by: ANESTHESIOLOGY

## 2020-03-25 PROCEDURE — 6370000000 HC RX 637 (ALT 250 FOR IP): Performed by: ANESTHESIOLOGY

## 2020-03-25 PROCEDURE — 2709999900 HC NON-CHARGEABLE SUPPLY: Performed by: OBSTETRICS & GYNECOLOGY

## 2020-03-25 PROCEDURE — 3700000000 HC ANESTHESIA ATTENDED CARE: Performed by: OBSTETRICS & GYNECOLOGY

## 2020-03-25 PROCEDURE — 2500000003 HC RX 250 WO HCPCS: Performed by: NURSE ANESTHETIST, CERTIFIED REGISTERED

## 2020-03-25 PROCEDURE — 7100000041 HC SPAR PHASE II RECOVERY - ADDTL 15 MIN: Performed by: OBSTETRICS & GYNECOLOGY

## 2020-03-25 PROCEDURE — 3700000001 HC ADD 15 MINUTES (ANESTHESIA): Performed by: OBSTETRICS & GYNECOLOGY

## 2020-03-25 PROCEDURE — 88305 TISSUE EXAM BY PATHOLOGIST: CPT

## 2020-03-25 PROCEDURE — 7100000040 HC SPAR PHASE II RECOVERY - FIRST 15 MIN: Performed by: OBSTETRICS & GYNECOLOGY

## 2020-03-25 PROCEDURE — 86900 BLOOD TYPING SEROLOGIC ABO: CPT

## 2020-03-25 PROCEDURE — 86901 BLOOD TYPING SEROLOGIC RH(D): CPT

## 2020-03-25 PROCEDURE — 3600000012 HC SURGERY LEVEL 2 ADDTL 15MIN: Performed by: OBSTETRICS & GYNECOLOGY

## 2020-03-25 PROCEDURE — 6360000002 HC RX W HCPCS: Performed by: STUDENT IN AN ORGANIZED HEALTH CARE EDUCATION/TRAINING PROGRAM

## 2020-03-25 PROCEDURE — 3600000002 HC SURGERY LEVEL 2 BASE: Performed by: OBSTETRICS & GYNECOLOGY

## 2020-03-25 PROCEDURE — 86850 RBC ANTIBODY SCREEN: CPT

## 2020-03-25 PROCEDURE — 6360000002 HC RX W HCPCS: Performed by: NURSE ANESTHETIST, CERTIFIED REGISTERED

## 2020-03-25 PROCEDURE — 85027 COMPLETE CBC AUTOMATED: CPT

## 2020-03-25 PROCEDURE — 59820 CARE OF MISCARRIAGE: CPT | Performed by: OBSTETRICS & GYNECOLOGY

## 2020-03-25 PROCEDURE — 7100000001 HC PACU RECOVERY - ADDTL 15 MIN: Performed by: OBSTETRICS & GYNECOLOGY

## 2020-03-25 PROCEDURE — 2580000003 HC RX 258: Performed by: OBSTETRICS & GYNECOLOGY

## 2020-03-25 PROCEDURE — 7100000000 HC PACU RECOVERY - FIRST 15 MIN: Performed by: OBSTETRICS & GYNECOLOGY

## 2020-03-25 RX ORDER — DIPHENHYDRAMINE HYDROCHLORIDE 50 MG/ML
INJECTION INTRAMUSCULAR; INTRAVENOUS PRN
Status: DISCONTINUED | OUTPATIENT
Start: 2020-03-25 | End: 2020-03-25 | Stop reason: SDUPTHER

## 2020-03-25 RX ORDER — DEXAMETHASONE SODIUM PHOSPHATE 10 MG/ML
INJECTION INTRAMUSCULAR; INTRAVENOUS PRN
Status: DISCONTINUED | OUTPATIENT
Start: 2020-03-25 | End: 2020-03-25 | Stop reason: SDUPTHER

## 2020-03-25 RX ORDER — OXYCODONE HYDROCHLORIDE AND ACETAMINOPHEN 5; 325 MG/1; MG/1
2 TABLET ORAL PRN
Status: COMPLETED | OUTPATIENT
Start: 2020-03-25 | End: 2020-03-25

## 2020-03-25 RX ORDER — MORPHINE SULFATE 2 MG/ML
2 INJECTION, SOLUTION INTRAMUSCULAR; INTRAVENOUS EVERY 5 MIN PRN
Status: DISCONTINUED | OUTPATIENT
Start: 2020-03-25 | End: 2020-03-25 | Stop reason: HOSPADM

## 2020-03-25 RX ORDER — LABETALOL HYDROCHLORIDE 5 MG/ML
5 INJECTION, SOLUTION INTRAVENOUS EVERY 10 MIN PRN
Status: DISCONTINUED | OUTPATIENT
Start: 2020-03-25 | End: 2020-03-25 | Stop reason: HOSPADM

## 2020-03-25 RX ORDER — GLYCOPYRROLATE 1 MG/5 ML
SYRINGE (ML) INTRAVENOUS PRN
Status: DISCONTINUED | OUTPATIENT
Start: 2020-03-25 | End: 2020-03-25 | Stop reason: SDUPTHER

## 2020-03-25 RX ORDER — LIDOCAINE HYDROCHLORIDE 10 MG/ML
INJECTION, SOLUTION EPIDURAL; INFILTRATION; INTRACAUDAL; PERINEURAL PRN
Status: DISCONTINUED | OUTPATIENT
Start: 2020-03-25 | End: 2020-03-25 | Stop reason: SDUPTHER

## 2020-03-25 RX ORDER — MAGNESIUM HYDROXIDE 1200 MG/15ML
LIQUID ORAL PRN
Status: DISCONTINUED | OUTPATIENT
Start: 2020-03-25 | End: 2020-03-25 | Stop reason: ALTCHOICE

## 2020-03-25 RX ORDER — OXYCODONE HYDROCHLORIDE AND ACETAMINOPHEN 5; 325 MG/1; MG/1
1 TABLET ORAL EVERY 6 HOURS PRN
Qty: 10 TABLET | Refills: 0 | Status: SHIPPED | OUTPATIENT
Start: 2020-03-25 | End: 2020-04-01

## 2020-03-25 RX ORDER — FENTANYL CITRATE 50 UG/ML
INJECTION, SOLUTION INTRAMUSCULAR; INTRAVENOUS PRN
Status: DISCONTINUED | OUTPATIENT
Start: 2020-03-25 | End: 2020-03-25 | Stop reason: SDUPTHER

## 2020-03-25 RX ORDER — ONDANSETRON 2 MG/ML
4 INJECTION INTRAMUSCULAR; INTRAVENOUS
Status: DISCONTINUED | OUTPATIENT
Start: 2020-03-25 | End: 2020-03-25 | Stop reason: HOSPADM

## 2020-03-25 RX ORDER — DIPHENHYDRAMINE HYDROCHLORIDE 50 MG/ML
12.5 INJECTION INTRAMUSCULAR; INTRAVENOUS
Status: DISCONTINUED | OUTPATIENT
Start: 2020-03-25 | End: 2020-03-25 | Stop reason: HOSPADM

## 2020-03-25 RX ORDER — METRONIDAZOLE 500 MG/1
500 TABLET ORAL 2 TIMES DAILY
Qty: 10 TABLET | Refills: 0 | Status: SHIPPED | OUTPATIENT
Start: 2020-03-25 | End: 2020-03-30

## 2020-03-25 RX ORDER — IBUPROFEN 600 MG/1
600 TABLET ORAL EVERY 6 HOURS PRN
Qty: 30 TABLET | Refills: 1 | Status: SHIPPED | OUTPATIENT
Start: 2020-03-25 | End: 2020-08-26 | Stop reason: ALTCHOICE

## 2020-03-25 RX ORDER — MIDAZOLAM HYDROCHLORIDE 1 MG/ML
INJECTION INTRAMUSCULAR; INTRAVENOUS PRN
Status: DISCONTINUED | OUTPATIENT
Start: 2020-03-25 | End: 2020-03-25 | Stop reason: SDUPTHER

## 2020-03-25 RX ORDER — SODIUM CHLORIDE, SODIUM LACTATE, POTASSIUM CHLORIDE, CALCIUM CHLORIDE 600; 310; 30; 20 MG/100ML; MG/100ML; MG/100ML; MG/100ML
INJECTION, SOLUTION INTRAVENOUS CONTINUOUS
Status: DISCONTINUED | OUTPATIENT
Start: 2020-03-25 | End: 2020-03-25 | Stop reason: HOSPADM

## 2020-03-25 RX ORDER — ONDANSETRON 2 MG/ML
INJECTION INTRAMUSCULAR; INTRAVENOUS PRN
Status: DISCONTINUED | OUTPATIENT
Start: 2020-03-25 | End: 2020-03-25 | Stop reason: SDUPTHER

## 2020-03-25 RX ORDER — FENTANYL CITRATE 50 UG/ML
25 INJECTION, SOLUTION INTRAMUSCULAR; INTRAVENOUS EVERY 5 MIN PRN
Status: DISCONTINUED | OUTPATIENT
Start: 2020-03-25 | End: 2020-03-25 | Stop reason: HOSPADM

## 2020-03-25 RX ORDER — PROPOFOL 10 MG/ML
INJECTION, EMULSION INTRAVENOUS PRN
Status: DISCONTINUED | OUTPATIENT
Start: 2020-03-25 | End: 2020-03-25 | Stop reason: SDUPTHER

## 2020-03-25 RX ORDER — OXYCODONE HYDROCHLORIDE AND ACETAMINOPHEN 5; 325 MG/1; MG/1
1 TABLET ORAL PRN
Status: COMPLETED | OUTPATIENT
Start: 2020-03-25 | End: 2020-03-25

## 2020-03-25 RX ORDER — NAPROXEN 500 MG/1
500 TABLET ORAL 2 TIMES DAILY WITH MEALS
Status: ON HOLD | COMMUNITY
End: 2020-03-25 | Stop reason: HOSPADM

## 2020-03-25 RX ORDER — KETOROLAC TROMETHAMINE 30 MG/ML
30 INJECTION, SOLUTION INTRAMUSCULAR; INTRAVENOUS ONCE
Status: COMPLETED | OUTPATIENT
Start: 2020-03-25 | End: 2020-03-25

## 2020-03-25 RX ADMIN — Medication 0.2 MG: at 08:30

## 2020-03-25 RX ADMIN — FENTANYL CITRATE 50 MCG: 50 INJECTION INTRAMUSCULAR; INTRAVENOUS at 08:26

## 2020-03-25 RX ADMIN — OXYCODONE HYDROCHLORIDE AND ACETAMINOPHEN 1 TABLET: 5; 325 TABLET ORAL at 09:28

## 2020-03-25 RX ADMIN — PROPOFOL 200 MG: 10 INJECTION, EMULSION INTRAVENOUS at 08:19

## 2020-03-25 RX ADMIN — KETOROLAC TROMETHAMINE 30 MG: 30 INJECTION, SOLUTION INTRAMUSCULAR; INTRAVENOUS at 09:00

## 2020-03-25 RX ADMIN — FENTANYL CITRATE 50 MCG: 50 INJECTION INTRAMUSCULAR; INTRAVENOUS at 08:19

## 2020-03-25 RX ADMIN — SODIUM CHLORIDE, POTASSIUM CHLORIDE, SODIUM LACTATE AND CALCIUM CHLORIDE: 600; 310; 30; 20 INJECTION, SOLUTION INTRAVENOUS at 07:41

## 2020-03-25 RX ADMIN — LIDOCAINE HYDROCHLORIDE 50 MG: 10 INJECTION, SOLUTION EPIDURAL; INFILTRATION; INTRACAUDAL; PERINEURAL at 08:19

## 2020-03-25 RX ADMIN — MIDAZOLAM HYDROCHLORIDE 2 MG: 1 INJECTION, SOLUTION INTRAMUSCULAR; INTRAVENOUS at 08:11

## 2020-03-25 RX ADMIN — Medication 12.5 MG: at 08:34

## 2020-03-25 RX ADMIN — ONDANSETRON 4 MG: 2 INJECTION, SOLUTION INTRAMUSCULAR; INTRAVENOUS at 08:34

## 2020-03-25 RX ADMIN — SODIUM CHLORIDE, POTASSIUM CHLORIDE, SODIUM LACTATE AND CALCIUM CHLORIDE: 600; 310; 30; 20 INJECTION, SOLUTION INTRAVENOUS at 07:50

## 2020-03-25 RX ADMIN — DEXAMETHASONE SODIUM PHOSPHATE 10 MG: 10 INJECTION INTRAMUSCULAR; INTRAVENOUS at 08:34

## 2020-03-25 ASSESSMENT — PAIN DESCRIPTION - DESCRIPTORS
DESCRIPTORS: CRAMPING
DESCRIPTORS: CRAMPING

## 2020-03-25 ASSESSMENT — PAIN DESCRIPTION - PAIN TYPE
TYPE: SURGICAL PAIN
TYPE: SURGICAL PAIN

## 2020-03-25 ASSESSMENT — PAIN - FUNCTIONAL ASSESSMENT: PAIN_FUNCTIONAL_ASSESSMENT: 0-10

## 2020-03-25 ASSESSMENT — PULMONARY FUNCTION TESTS
PIF_VALUE: 19
PIF_VALUE: 16
PIF_VALUE: 19
PIF_VALUE: 20
PIF_VALUE: 19
PIF_VALUE: 3
PIF_VALUE: 21
PIF_VALUE: 2
PIF_VALUE: 2
PIF_VALUE: 0
PIF_VALUE: 19
PIF_VALUE: 2
PIF_VALUE: 19
PIF_VALUE: 1
PIF_VALUE: 5
PIF_VALUE: 16
PIF_VALUE: 0
PIF_VALUE: 2
PIF_VALUE: 0
PIF_VALUE: 19
PIF_VALUE: 1
PIF_VALUE: 20
PIF_VALUE: 2
PIF_VALUE: 19
PIF_VALUE: 20
PIF_VALUE: 16
PIF_VALUE: 22
PIF_VALUE: 18
PIF_VALUE: 18
PIF_VALUE: 19
PIF_VALUE: 23
PIF_VALUE: 2
PIF_VALUE: 0
PIF_VALUE: 18
PIF_VALUE: 1
PIF_VALUE: 20
PIF_VALUE: 20
PIF_VALUE: 19
PIF_VALUE: 18
PIF_VALUE: 19
PIF_VALUE: 20
PIF_VALUE: 18
PIF_VALUE: 18
PIF_VALUE: 1
PIF_VALUE: 1
PIF_VALUE: 19

## 2020-03-25 ASSESSMENT — PAIN SCALES - GENERAL
PAINLEVEL_OUTOF10: 5
PAINLEVEL_OUTOF10: 3
PAINLEVEL_OUTOF10: 3
PAINLEVEL_OUTOF10: 5
PAINLEVEL_OUTOF10: 0

## 2020-03-25 ASSESSMENT — PAIN DESCRIPTION - LOCATION
LOCATION: ABDOMEN
LOCATION: ABDOMEN

## 2020-03-25 ASSESSMENT — LIFESTYLE VARIABLES: SMOKING_STATUS: 0

## 2020-03-25 NOTE — H&P
Ref Range Status    hCG Quant 03/09/2020 3,155* <5 IU/L Final    Comment:    Non-preg premeno   <=5  Postmeno           <=8  Male               <=3  If HCG results do not concur with clinical observations, additional testing to confirm   results is recommended. Elevated results not associated with pregnancy may be found in patients with other diseases   such as tumors of the germ cells (testis, ovaries, etc.), bladder, pancreas, stomach, lungs,   and liver.      Admission on 03/06/2020, Discharged on 03/06/2020   Component Date Value Ref Range Status    WBC 03/06/2020 6.0  3.5 - 11.3 k/uL Final    RBC 03/06/2020 4.84  3.95 - 5.11 m/uL Final    Hemoglobin 03/06/2020 13.3  11.9 - 15.1 g/dL Final    Hematocrit 03/06/2020 42.8  36.3 - 47.1 % Final    MCV 03/06/2020 88.4  82.6 - 102.9 fL Final    MCH 03/06/2020 27.5  25.2 - 33.5 pg Final    MCHC 03/06/2020 31.1  28.4 - 34.8 g/dL Final    RDW 03/06/2020 12.7  11.8 - 14.4 % Final    Platelets 11/41/6733 259  138 - 453 k/uL Final    MPV 03/06/2020 10.0  8.1 - 13.5 fL Final    NRBC Automated 03/06/2020 0.0  0.0 per 100 WBC Final    Differential Type 03/06/2020 NOT REPORTED   Final    Seg Neutrophils 03/06/2020 69* 36 - 65 % Final    Lymphocytes 03/06/2020 21* 24 - 43 % Final    Monocytes 03/06/2020 6  3 - 12 % Final    Eosinophils % 03/06/2020 3  1 - 4 % Final    Basophils 03/06/2020 1  0 - 2 % Final    Immature Granulocytes 03/06/2020 0  0 % Final    Segs Absolute 03/06/2020 4.10  1.50 - 8.10 k/uL Final    Absolute Lymph # 03/06/2020 1.27  1.10 - 3.70 k/uL Final    Absolute Mono # 03/06/2020 0.38  0.10 - 1.20 k/uL Final    Absolute Eos # 03/06/2020 0.15  0.00 - 0.44 k/uL Final    Basophils Absolute 03/06/2020 0.05  0.00 - 0.20 k/uL Final    Absolute Immature Granulocyte 03/06/2020 0.02  0.00 - 0.30 k/uL Final    WBC Morphology 03/06/2020 NOT REPORTED   Final    RBC Morphology 03/06/2020 NOT REPORTED   Final    Platelet Estimate 59/23/3228 NOT Measurements: Estimated gestational age by current ultrasound: Less than 5 weeks Estimated gestational by LMP/prior ultrasound: 2 weeks and 3 days     Single intrauterine gestational sac without identifiable fetal pole at this time, probably related to early gestation. Normal appearing bilateral ovaries. No abnormal adnexal mass to indicate the presence of ectopic pregnancy. Short interval follow-up with beta HCG correlation would be recommended to assess viability. DIAGNOSIS & PLAN:  1. Incomplete    - Proceed with planned procedure: suction D&C   - Consent signed, on chart. - The patient is ready for transport to the operative suite. Counseling: The patient was counseled on all options both medical and surgical, conservative as well as definitive. She has elected to proceed with the procedure as stated above.     The patient was counseled on the procedure. Risks and complications were reviewed in detail. The patients orders, labs, consents have been completed. The history and physical as well as all supporting surgical documentation will be forwarded to the pre-operative holding area.     The patient is aware that this procedure may not alleviate her symptoms. That there may be a necessity for a second surgery and that there may be an incomplete removal of abnormal tissue. Krzysztof Perez DO  Ob/Gyn Resident  Pager: [phone]  [address] [place] [address]  3/25/2020, 7:36 AM         Attending Physician Statement  I have discussed the care of Umm Marcus, including pertinent history and exam findings,  with the resident. I have reviewed the key elements of all parts of the encounter with the resident. I agree with the assessment, plan and orders as documented by the resident.   (GE Modifier)

## 2020-03-25 NOTE — OP NOTE
Operative Note  Department of Obstetrics and Gynecology  [facility]       Patient: Mónica Silva   : 1995  MRN: [id number]       Acct: [de-identified]   PCP: No primary care provider on file. Date of Procedure: 3/25/20    Pre-operative Diagnosis: 22 y.o. female  at 7 weeks gestation based on LMP with incomplete  failed expectant management  Seizures  Menorrhagia    Post-operative Diagnosis: same as above, retained products of conception    Procedure: Suction Dilation and Curettage     Surgeon: Dr. Marguerite Krause  Assistants: Felipe Jackson DO, PGY1  Anesthesia: general    Indications: Patient was previously seen for vaginal bleeding and passage of tissue with TVUS revealing single intrauterine gestational sac without fetal pole. She opted for expectant management at that time; however has now chosen surgical management for incomplete . Procedure Details: The patient was seen in the pre-op room. The risks, benefits, complications, treatment options, and expected outcomes were discussed with the patient. The patient concurred with the proposed plan, giving informed consent. The patient was taken to the Operating Room and identified as Mónica Silva and the procedure was verified. A Time Out was held and the above information confirmed. After administration of general anesthesia, the patient was placed in the dorsolithotomy position and examination under general anesthesia performed with findings as noted above. The patient was prepped and draped in the usual sterile fashion. The bladder was drained with a straight catheter, and a weighted speculum was placed into the vagina to visualize the cervix. The cervix was grasped with a single-tooth tenaculum. The cervix was dilated with rc dilators to size 16. A curved 7-mm suction curettage was advanced into the uterine cavity without difficulty and products of conception were evacuated.   A sharp curette was then passed into the uterine cavity to ensure expulsion of all products of conception. The suction curette was passed a last time to ensure all remaining products were expelled. All instruments were then removed. Hemostasis was visualized at the tenaculum site on the cervix. Instrument, sponge, and needle counts were correct at the conclusion of the case. SCDs for DVT prophylaxis remain in place for the post operative period. Dr. Tommie Longo was present for the entire operation. Findings:  Approximately 6 week sized midposition uterus, products of conception  Estimated Blood Loss: less than 50 ml  Drains:  None  Total IV Fluids: 500ml  Urine output: 200 ml clear urine   Specimens: products of conception, sent to pathology  Instrument and Sponge Count: Correct  Complications: none  Condition: stable, transfer to post anesthesia recovery    Josy Brody DO  Ob/Gyn Resident  3/25/2020, 8:54 AM       Date: 3/25/2020  Time: 9:44 AM    Attending Attestation:   I was present and scrubbed for the entire procedure.     Attending Name: Xavier Cash M.D.

## 2020-03-25 NOTE — ANESTHESIA PRE PROCEDURE
0.72 03/06/2020    GFRAA >60 03/06/2020    LABGLOM >60 03/06/2020    GLUCOSE 88 03/06/2020    PROT 7.4 12/02/2018    CALCIUM 9.3 03/06/2020    BILITOT 0.56 12/02/2018    ALKPHOS 61 12/02/2018    AST 24 12/02/2018    ALT 19 12/02/2018       BMP    Lab Results   Component Value Date     03/06/2020    K 4.5 03/06/2020     03/06/2020    CO2 22 03/06/2020    BUN 9 03/06/2020    CREATININE 0.72 03/06/2020    CALCIUM 9.3 03/06/2020    GFRAA >60 03/06/2020    LABGLOM >60 03/06/2020    GLUCOSE 88 03/06/2020       POC Testing  No results for input(s): POCGLU, POCNA, POCK, POCCL, POCBUN, POCHEMO, POCHCT in the last 72 hours. Coags  No results found for: PROTIME, INR, APTT    HCG (If Applicable)   Lab Results   Component Value Date    PREGTESTUR positive 03/06/2020    HCG POSITIVE (A) 03/06/2020    HCGQUANT 2,740 (H) 03/11/2020        ABGs No results found for: PHART, PO2ART, CZZ1SUM, FTN7VQD, BEART, F5MUAMND     Type & Screen (If Applicable)  No results found for: LABABO, [address]    Radiology (If Applicable)    Cardiac Testing (If Applicable)     EKG (If Applicable) short NE          Medications prior to admission:   Prior to Admission medications    Medication Sig Start Date End Date Taking? Authorizing Provider   levETIRAcetam (KEPPRA) 500 MG tablet Take 1 tablet by mouth 2 times daily 12/4/18  Yes Carmita Baihill Retholtz, DO   naproxen (NAPROSYN) 500 MG tablet Take 500 mg by mouth 2 times daily (with meals)    Historical Provider, MD       Current medications:    No current facility-administered medications for this encounter.         Allergies:  No Known Allergies    Problem List:    Patient Active Problem List   Diagnosis Code    Dysmenorrhea N94.6    Menorrhagia N92.0    Back pain M54.9    Observed seizure-like activity ([facility] [address] R56.9       Past Medical History:        Diagnosis Date    Adverse reaction to birth control pills 2015    vomiting with ortho-cyclen, every day X2 weeks    Back pain     with periods    Dysmenorrhea     Epilepsy ([facility] [address]     Menorrhagia        Past Surgical History:        Procedure Laterality Date    TYMPANOSTOMY TUBE PLACEMENT  age 4       Social History:    Social History     Tobacco Use    Smoking status: Former Smoker     Last attempt to quit: 2015     Years since quittin.3    Smokeless tobacco: Never Used   Substance Use Topics    Alcohol use: No                                Counseling given: Not Answered      Vital Signs (Current): There were no vitals filed for this visit. BP Readings from Last 3 Encounters:   20 110/62   20 138/77   19 114/66       NPO Status: Time of last liquid consumption: 2300(took RX with sips this am)                        Time of last solid consumption: 2300                        Date of last liquid consumption: 20                        Date of last solid food consumption: 20    BMI:   Wt Readings from Last 3 Encounters:   20 154 lb 12.8 oz (70.2 kg)   20 155 lb (70.3 kg)   19 160 lb (72.6 kg)     There is no height or weight on file to calculate BMI.    CBC:   Lab Results   Component Value Date    WBC 6.0 2020    RBC 4.84 2020    HGB 13.3 2020    HCT 42.8 2020    MCV 88.4 2020    RDW 12.7 2020     2020       CMP:   Lab Results   Component Value Date     2020    K 4.5 2020     2020    CO2 22 2020    BUN 9 2020    CREATININE 0.72 2020    GFRAA >60 2020    LABGLOM >60 2020    GLUCOSE 88 2020    PROT 7.4 2018    CALCIUM 9.3 2020    BILITOT 0.56 2018    ALKPHOS 61 2018    AST 24 2018    ALT 19 2018       POC Tests: No results for input(s): POCGLU, POCNA, POCK, POCCL, POCBUN, POCHEMO, POCHCT in the last 72 hours.     Coags: No results found for: PROTIME, INR, APTT    HCG (If Applicable):   Lab Results   Component Value Date    PREGTESTUR positive 03/06/2020    HCG POSITIVE (A) 03/06/2020    HCGQUANT 2,740 (H) 03/11/2020        ABGs: No results found for: PHART, PO2ART, ENU6LDK, FKG4EWX, BEART, E4FJDNGG     Type & Screen (If Applicable):  No results found for: LABABO, LABRH    Anesthesia Evaluation   no history of anesthetic complications:   Airway: Mallampati: II     Neck ROM: full   Dental:          Pulmonary:       (-) asthma, recent URI and not a current smoker                           Cardiovascular:  Exercise tolerance: good (>4 METS),                     Neuro/Psych:   (+) seizures: well controlled, neuromuscular disease:,             GI/Hepatic/Renal:        (-) GERD       Endo/Other:        (-) diabetes mellitus               Abdominal:           Vascular:                                        Anesthesia Plan      general     ASA 2     (Asa 2)  Induction: intravenous.                           Brooke Buck MD   3/25/2020

## 2020-03-26 LAB — SURGICAL PATHOLOGY REPORT: NORMAL

## 2020-04-08 ENCOUNTER — TELEPHONE (OUTPATIENT)
Dept: OBGYN CLINIC | Age: 25
End: 2020-04-08

## 2020-04-08 NOTE — TELEPHONE ENCOUNTER
I left Umm a message to call to schedule a video apt for her post op. Ben Stapleton has openings for videos this Thursday morning if that could work for her.

## 2020-04-09 ENCOUNTER — TELEMEDICINE (OUTPATIENT)
Dept: OBGYN CLINIC | Age: 25
End: 2020-04-09

## 2020-04-09 PROCEDURE — 99024 POSTOP FOLLOW-UP VISIT: CPT | Performed by: OBSTETRICS & GYNECOLOGY

## 2020-04-09 ASSESSMENT — ENCOUNTER SYMPTOMS
ABDOMINAL PAIN: 0
VOMITING: 0
CONSTIPATION: 0
WHEEZING: 0
DIARRHEA: 0
COUGH: 0
NAUSEA: 0

## 2020-04-09 NOTE — PROGRESS NOTES
Laterality Date    DILATION AND CURETTAGE OF UTERUS N/A 3/25/2020    DILATATION AND CURETTAGE SUCTION performed by Jorge Schneider MD at [address]  age 2     OB History        1    Para   0    Term   0       0    AB   1    Living   0       SAB   1    TAB   0    Ectopic   0    Molar        Multiple   0    Live Births              Obstetric Comments   Incomplete ab with suction D&C 3/25/20             PHYSICAL EXAMINATION:    Constitutional: [x] Appears well-developed and well-nourished [] No apparent distress      [] Abnormal-   Mental status  [x] Alert and awake  [] Oriented to person/place/time []Able to follow commands      Eyes:  EOM    [x]  Normal  [] Abnormal-  Sclera  []  Normal  [] Abnormal -         Discharge []  None visible  [] Abnormal -    HENT:   [x] Normocephalic, atraumatic. [] Abnormal   [] Mouth/Throat: Mucous membranes are moist.     External Ears [x] Normal  [] Abnormal-     Neck: [x] No visualized mass     Pulmonary/Chest: [x] Respiratory effort normal.  [] No visualized signs of difficulty breathing or respiratory distress        [] Abnormal-      Musculoskeletal:   [x] Normal gait with no signs of ataxia         [] Normal range of motion of neck        [] Abnormal-       Neurological:        [x] No Facial Asymmetry (Cranial nerve 7 motor function) (limited exam to video visit)          [] No gaze palsy        [] Abnormal-         Skin:        [x] No significant exanthematous lesions or discoloration noted on facial skin         [] Abnormal-            Psychiatric:       [x] Normal Affect [] No Hallucinations        [] Abnormal-     ASSESSMENT/PLAN:   Diagnosis Orders   1. Suction D&C (incomplete ab) 3/25/20     2. Postoperative examination           We discussed that her period may return in anywhere from 2-6 weeks.  Some studies show the chance of a more successful pregnancy if conception occurs within 3 months of a miscarriage, but we also

## 2020-04-24 PROBLEM — Z98.890 POST-OPERATIVE STATE: Status: RESOLVED | Noted: 2020-03-25 | Resolved: 2020-04-24

## 2020-08-26 ENCOUNTER — HOSPITAL ENCOUNTER (EMERGENCY)
Age: 25
Discharge: HOME | End: 2020-08-26
Attending: EMERGENCY MEDICINE
Payer: COMMERCIAL

## 2020-08-26 VITALS
TEMPERATURE: 100.4 F | BODY MASS INDEX: 23.49 KG/M2 | HEIGHT: 68 IN | HEART RATE: 103 BPM | DIASTOLIC BLOOD PRESSURE: 69 MMHG | OXYGEN SATURATION: 97 % | RESPIRATION RATE: 18 BRPM | WEIGHT: 155 LBS | SYSTOLIC BLOOD PRESSURE: 127 MMHG

## 2020-08-26 LAB
DIRECT EXAM: ABNORMAL
Lab: ABNORMAL
SPECIMEN DESCRIPTION: ABNORMAL

## 2020-08-26 PROCEDURE — 99283 EMERGENCY DEPT VISIT LOW MDM: CPT

## 2020-08-26 PROCEDURE — 6370000000 HC RX 637 (ALT 250 FOR IP): Performed by: EMERGENCY MEDICINE

## 2020-08-26 PROCEDURE — 87880 STREP A ASSAY W/OPTIC: CPT

## 2020-08-26 RX ORDER — AMOXICILLIN 875 MG/1
875 TABLET, COATED ORAL 2 TIMES DAILY
Qty: 20 TABLET | Refills: 0 | Status: SHIPPED | OUTPATIENT
Start: 2020-08-26 | End: 2020-09-05

## 2020-08-26 RX ORDER — IBUPROFEN 600 MG/1
600 TABLET ORAL EVERY 6 HOURS PRN
Qty: 120 TABLET | Refills: 0 | Status: SHIPPED | OUTPATIENT
Start: 2020-08-26

## 2020-08-26 RX ORDER — ACETAMINOPHEN 325 MG/1
650 TABLET ORAL ONCE
Status: COMPLETED | OUTPATIENT
Start: 2020-08-26 | End: 2020-08-26

## 2020-08-26 RX ADMIN — ACETAMINOPHEN 650 MG: 325 TABLET ORAL at 02:52

## 2020-08-26 ASSESSMENT — PAIN SCALES - GENERAL
PAINLEVEL_OUTOF10: 5
PAINLEVEL_OUTOF10: 5

## 2020-08-26 ASSESSMENT — PAIN DESCRIPTION - LOCATION: LOCATION: THROAT

## 2020-08-26 ASSESSMENT — PAIN DESCRIPTION - ORIENTATION: ORIENTATION: RIGHT;LEFT

## 2020-08-26 ASSESSMENT — ENCOUNTER SYMPTOMS: SORE THROAT: 1

## 2020-08-26 NOTE — ED PROVIDER NOTES
EMERGENCY DEPARTMENT ENCOUNTER    Pt Name: Edyta Garcia  MRN: [id number]  [place] 1995  Date of evaluation: 20  CHIEF COMPLAINT       Chief Complaint   Patient presents with    Pharyngitis    Fever     HISTORY OF PRESENT ILLNESS   27-year-old female with seizure disorder presents to the emergency room for sore throat and fever. Patient states that she woke up yesterday around 5 AM with symptoms. She states that she did vomit a couple times yesterday and did note some blood-tinged sputum. REVIEW OF SYSTEMS     Review of Systems   Constitutional: Positive for fever. HENT: Positive for sore throat. All other systems reviewed and are negative. PASTMEDICAL HISTORY     Past Medical History:   Diagnosis Date    Adverse reaction to birth control pills     vomiting with ortho-cyclen, every day X2 weeks    Back pain     with periods    Dysmenorrhea     Epilepsy ([address]     Menorrhagia      Past Problem List  Patient Active Problem List   Diagnosis Code    Dysmenorrhea N94.6    Menorrhagia N92.0    Back pain M54.9    Observed seizure-like activity ([address] R56.9     SURGICAL HISTORY       Past Surgical History:   Procedure Laterality Date    DILATION AND CURETTAGE OF UTERUS N/A 3/25/2020    DILATATION AND CURETTAGE SUCTION performed by Elian Bull MD at [address] [facility]  age 4     CURRENT MEDICATIONS       Discharge Medication List as of 2020  3:30 AM      CONTINUE these medications which have NOT CHANGED    Details   levETIRAcetam (KEPPRA) 500 MG tablet Take 1 tablet by mouth 2 times daily, Disp-60 tablet, R-3Print           ALLERGIES     has No Known Allergies. FAMILY HISTORY     She indicated that the status of her paternal grandmother is unknown. She indicated that the status of her paternal grandfather is unknown. She indicated that one of her two paternal aunts is . She indicated that the status of her paternal uncle is unknown. images (except ED bedside ultrasound) are read by the radiologist, see reports below, unless otherwisenoted in MDM or here. No orders to display     LABS: All lab results were reviewed by myself, and all abnormals are listed below. Labs Reviewed   STREP SCREEN GROUP A THROAT - Abnormal; Notable for the following components:       Result Value    Direct Exam POSITIVE for Group A Streptococci (*)     All other components within normal limits       EMERGENCY DEPARTMENTCOURSE:         Vitals:    Vitals:    08/26/20 0238 08/26/20 0340   BP: 127/69    Pulse: 103    Resp: 18    Temp: 101.2 °F (38.4 °C) 100.4 °F (38 °C)   TempSrc: Oral Oral   SpO2: 97%    Weight: 155 lb (70.3 kg)    Height: 5' 8\" (1.727 m)        The patient was given the following medications while in the emergency department:  Orders Placed This Encounter   Medications    acetaminophen (TYLENOL) tablet 650 mg    amoxicillin (AMOXIL) 875 MG tablet     Sig: Take 1 tablet by mouth 2 times daily for 10 days     Dispense:  20 tablet     Refill:  0    ibuprofen (IBU) 600 MG tablet     Sig: Take 1 tablet by mouth every 6 hours as needed for Pain     Dispense:  120 tablet     Refill:  0     CONSULTS:  None    FINAL IMPRESSION      1. Streptococcal sore throat          DISPOSITION/PLAN   DISPOSITION Decision To Discharge 08/26/2020 03:28:54 AM      PATIENT REFERRED TO:  No follow-up provider specified.   DISCHARGE MEDICATIONS:  Discharge Medication List as of 8/26/2020  3:30 AM      START taking these medications    Details   amoxicillin (AMOXIL) 875 MG tablet Take 1 tablet by mouth 2 times daily for 10 days, Disp-20 tablet,R-0Print      ibuprofen (IBU) 600 MG tablet Take 1 tablet by mouth every 6 hours as needed for Pain, Disp-120 tablet,R-0Print           Brady Montanez MD  Attending Emergency Physician                  Gaibno Brown MD  08/26/20 5137       Gabino Brown MD  08/29/20 2920

## 2020-10-16 ENCOUNTER — HOSPITAL ENCOUNTER (EMERGENCY)
Age: 25
Discharge: HOME | End: 2020-10-16
Attending: EMERGENCY MEDICINE
Payer: COMMERCIAL

## 2020-10-16 VITALS
HEART RATE: 95 BPM | SYSTOLIC BLOOD PRESSURE: 118 MMHG | OXYGEN SATURATION: 100 % | HEIGHT: 72 IN | DIASTOLIC BLOOD PRESSURE: 66 MMHG | RESPIRATION RATE: 16 BRPM | WEIGHT: 160 LBS | BODY MASS INDEX: 21.67 KG/M2 | TEMPERATURE: 99.4 F

## 2020-10-16 LAB
-: ABNORMAL
AMORPHOUS: ABNORMAL
BACTERIA: ABNORMAL
BILIRUBIN URINE: NEGATIVE
CASTS UA: ABNORMAL /LPF
CHP ED QC CHECK: NORMAL
COLOR: YELLOW
COMMENT UA: ABNORMAL
CRYSTALS, UA: ABNORMAL /HPF
EPITHELIAL CELLS UA: ABNORMAL /HPF (ref 0–5)
GLUCOSE URINE: NEGATIVE
HCG, PREGNANCY URINE (POC): NEGATIVE
KETONES, URINE: NEGATIVE
LEUKOCYTE ESTERASE, URINE: ABNORMAL
MUCUS: ABNORMAL
NITRITE, URINE: NEGATIVE
OTHER OBSERVATIONS UA: ABNORMAL
PH UA: 7.5 (ref 5–8)
PREGNANCY TEST URINE, POC: NORMAL
PROTEIN UA: ABNORMAL
RBC UA: ABNORMAL /HPF (ref 0–2)
RENAL EPITHELIAL, UA: ABNORMAL /HPF
SPECIFIC GRAVITY UA: 1.01 (ref 1–1.03)
TRICHOMONAS: ABNORMAL
TURBIDITY: ABNORMAL
URINE HGB: ABNORMAL
UROBILINOGEN, URINE: NORMAL
WBC UA: ABNORMAL /HPF (ref 0–5)
YEAST: ABNORMAL

## 2020-10-16 PROCEDURE — 81025 URINE PREGNANCY TEST: CPT

## 2020-10-16 PROCEDURE — 87086 URINE CULTURE/COLONY COUNT: CPT

## 2020-10-16 PROCEDURE — 81001 URINALYSIS AUTO W/SCOPE: CPT

## 2020-10-16 PROCEDURE — 87077 CULTURE AEROBIC IDENTIFY: CPT

## 2020-10-16 PROCEDURE — 86403 PARTICLE AGGLUT ANTBDY SCRN: CPT

## 2020-10-16 PROCEDURE — 99282 EMERGENCY DEPT VISIT SF MDM: CPT

## 2020-10-16 RX ORDER — CEPHALEXIN 500 MG/1
500 CAPSULE ORAL 2 TIMES DAILY
Qty: 10 CAPSULE | Refills: 0 | Status: SHIPPED | OUTPATIENT
Start: 2020-10-16 | End: 2020-10-21

## 2020-10-16 ASSESSMENT — ENCOUNTER SYMPTOMS
CHEST TIGHTNESS: 0
ABDOMINAL DISTENTION: 0
VOMITING: 0
SINUS PAIN: 0
SHORTNESS OF BREATH: 0
EYES NEGATIVE: 1
CONSTIPATION: 0
DIARRHEA: 0
APNEA: 0
COLOR CHANGE: 0

## 2020-10-16 ASSESSMENT — PAIN SCALES - GENERAL: PAINLEVEL_OUTOF10: 5

## 2020-10-16 ASSESSMENT — PAIN DESCRIPTION - ORIENTATION: ORIENTATION: LOWER

## 2020-10-16 ASSESSMENT — PAIN DESCRIPTION - LOCATION: LOCATION: ABDOMEN;BACK

## 2020-10-16 NOTE — ED PROVIDER NOTES
EMERGENCY DEPARTMENT ENCOUNTER    Pt Name: Haylee Sweet  MRN: [id number]  [place] 1995  Date of evaluation: 10/16/20  CHIEF COMPLAINT       Chief Complaint   Patient presents with    Urinary Frequency     with pain/pressure to pelvic area. states she \"feels like something is pushing on her bladder\"     HISTORY OF PRESENT ILLNESS   52-year-old female presents to the emergency room for pelvic pressure and urinary frequency. She states she will run to the bathroom because she feels urgency and will go a very small amount and then still feel she has to go. Symptoms have been going on for about a week. Patient denies any nausea vomiting or fevers. Patient reports a normal menstrual cycle last month. REVIEW OF SYSTEMS     Review of Systems   Constitutional: Negative for activity change, chills and diaphoresis. HENT: Negative for congestion, sinus pain and tinnitus. Eyes: Negative. Respiratory: Negative for apnea, chest tightness and shortness of breath. Gastrointestinal: Negative for abdominal distention, constipation, diarrhea and vomiting. Genitourinary: Positive for frequency and urgency. Negative for difficulty urinating. Musculoskeletal: Negative for arthralgias and myalgias. Skin: Negative for color change and rash. Neurological: Negative for dizziness. Hematological: Negative. Psychiatric/Behavioral: Negative.         PASTMEDICAL HISTORY     Past Medical History:   Diagnosis Date    Adverse reaction to birth control pills 2015    vomiting with ortho-cyclen, every day X2 weeks    Back pain     with periods    Dysmenorrhea     Epilepsy ([address]     Menorrhagia      Past Problem List  Patient Active Problem List   Diagnosis Code    Dysmenorrhea N94.6    Menorrhagia N92.0    Back pain M54.9    Observed seizure-like activity ([address] R56.9     SURGICAL HISTORY       Past Surgical History:   Procedure Laterality Date    DILATION AND CURETTAGE OF UTERUS N/A 3/25/2020 DILATATION AND CURETTAGE SUCTION performed by Mook Rubin MD at [address]  age 4     CURRENT MEDICATIONS       Discharge Medication List as of 10/16/2020  2:59 AM      CONTINUE these medications which have NOT CHANGED    Details   levETIRAcetam (KEPPRA) 500 MG tablet Take 1 tablet by mouth 2 times daily, Disp-60 tablet, R-3Print      ibuprofen (IBU) 600 MG tablet Take 1 tablet by mouth every 6 hours as needed for Pain, Disp-120 tablet,R-0Print           ALLERGIES     has No Known Allergies. FAMILY HISTORY     She indicated that the status of her paternal grandmother is unknown. She indicated that the status of her paternal grandfather is unknown. She indicated that one of her two paternal aunts is . She indicated that the status of her paternal uncle is unknown. SOCIAL HISTORY       Social History     Tobacco Use    Smoking status: Former Smoker     Last attempt to quit: 2015     Years since quittin.8    Smokeless tobacco: Never Used   Substance Use Topics    Alcohol use: No    Drug use: Not Currently     Types: Marijuana     PHYSICAL EXAM     INITIAL VITALS: /66   Pulse 95   Temp 99.4 °F (37.4 °C)   Resp 16   Ht (!) 8' 5\" (2.565 m)   Wt 160 lb (72.6 kg)   LMP 2020   SpO2 100%   BMI 11.03 kg/m²    Physical Exam  Constitutional:       General: She is not in acute distress. Appearance: She is well-developed. HENT:      Head: Normocephalic. Eyes:      Pupils: Pupils are equal, round, and reactive to light. Cardiovascular:      Rate and Rhythm: Normal rate and regular rhythm. Heart sounds: Normal heart sounds. Pulmonary:      Effort: Pulmonary effort is normal. No respiratory distress. Breath sounds: Normal breath sounds. Abdominal:      General: Bowel sounds are normal.      Palpations: Abdomen is soft. Tenderness: There is no abdominal tenderness. Musculoskeletal: Normal range of motion.    Skin: General: Skin is warm and dry. Neurological:      Mental Status: She is alert and oriented to person, place, and time. MEDICAL DECISION MAKING:     Well-appearing 61-year-old female coming into the emergency room with feeling of bladder spasm urgency and frequency. Urinalysis shows bacteria as well as increased white blood cells on urinalysis. Patient started on oral antibiotics and instructed to follow-up with her primary care physician. CRITICAL CARE:       PROCEDURES:    Procedures    DIAGNOSTIC RESULTS   EKG:All EKG's are interpreted by the Emergency Department Physician who either signs or Co-signs this chart in the absence of a cardiologist.        RADIOLOGY:All plain film, CT, MRI, and formal ultrasound images (except ED bedside ultrasound) are read by the radiologist, see reports below, unless otherwisenoted in MDM or here. No orders to display     LABS: All lab results were reviewed by myself, and all abnormals are listed below.   Labs Reviewed   URINE RT REFLEX TO CULTURE - Abnormal; Notable for the following components:       Result Value    Turbidity UA CLOUDY (*)     Urine Hgb 2+ (*)     Protein, UA TRACE (*)     Leukocyte Esterase, Urine MOD (*)     All other components within normal limits   MICROSCOPIC URINALYSIS - Abnormal; Notable for the following components:    Bacteria, UA FEW (*)     All other components within normal limits   POCT URINE PREGNANCY - Normal   CULTURE, URINE   POCT URINE PREGNANCY       EMERGENCY DEPARTMENTCOURSE:         Vitals:    Vitals:    10/16/20 0238   BP: 118/66   Pulse: 95   Resp: 16   Temp: 99.4 °F (37.4 °C)   SpO2: 100%   Weight: 160 lb (72.6 kg)   Height: (!) 8' 5\" (2.565 m)       The patient was given the following medications while in the emergency department:  Orders Placed This Encounter   Medications    cephALEXin (KEFLEX) 500 MG capsule     Sig: Take 1 capsule by mouth 2 times daily for 5 days     Dispense:  10 capsule     Refill:  0 CONSULTS:  None    FINAL IMPRESSION      1. Acute cystitis with hematuria          DISPOSITION/PLAN   DISPOSITION Decision To Discharge 10/16/2020 02:59:08 AM      PATIENT REFERRED TO:  No follow-up provider specified.   DISCHARGE MEDICATIONS:  Discharge Medication List as of 10/16/2020  2:59 AM      START taking these medications    Details   cephALEXin (KEFLEX) 500 MG capsule Take 1 capsule by mouth 2 times daily for 5 days, Disp-10 capsule,R-0Print           Jennie Acuna MD  Attending Emergency Physician                  Radha Darby MD  10/16/20 4311       Radha Darby MD  10/16/20 0337

## 2020-10-16 NOTE — ED NOTES
Patient education flyer [facility] ([facility]), Taking antibiotics: What you need to know was provided and reviewed. Questions answered and understanding was verbalized by the patient and/or family.         Yasmine Adair RN  10/16/20 8763

## 2020-10-16 NOTE — LETTER
[facility] ED  [address] [id number]  Phone: [phone]               October 16, 2020    Patient: Rafi Lewis   YOB: 1995   Date of Visit: 10/16/2020       To Whom It May Concern:    Rafi Lewis was seen and treated in our emergency department on 10/16/2020. She may return to work 10/17/2020.       Sincerely,       Geneva Mishra RN         Signature:__________________________________

## 2020-10-17 LAB
CULTURE: ABNORMAL
Lab: ABNORMAL
SPECIMEN DESCRIPTION: ABNORMAL

## 2021-03-08 ENCOUNTER — OFFICE VISIT (OUTPATIENT)
Dept: OBGYN CLINIC | Age: 26
End: 2021-03-08
Payer: COMMERCIAL

## 2021-03-08 VITALS
WEIGHT: 154 LBS | HEIGHT: 68 IN | BODY MASS INDEX: 23.34 KG/M2 | DIASTOLIC BLOOD PRESSURE: 78 MMHG | SYSTOLIC BLOOD PRESSURE: 122 MMHG

## 2021-03-08 DIAGNOSIS — Z31.69 INFERTILITY COUNSELING: ICD-10-CM

## 2021-03-08 DIAGNOSIS — Z84.89 FAMILY HISTORY OF GENETIC DISORDER: Primary | ICD-10-CM

## 2021-03-08 PROCEDURE — 99213 OFFICE O/P EST LOW 20 MIN: CPT | Performed by: OBSTETRICS & GYNECOLOGY

## 2021-03-08 ASSESSMENT — ENCOUNTER SYMPTOMS
COUGH: 0
VOMITING: 0
CONSTIPATION: 0
NAUSEA: 0
ABDOMINAL PAIN: 0
WHEEZING: 0
DIARRHEA: 0

## 2021-03-08 NOTE — PROGRESS NOTES
[address] [facility], [address] [place] [address]  DATE OF VISIT:  3/8/21    Lucita Barrera    :  1995  CHIEF COMPLAINT:    Chief Complaint   Patient presents with    Menstrual Problem     Has been 1 year SAB, trying for pregnancy        HPI :   Lucita Barrera is a 26 y.o. female here to discuss getting pregnant. She had a miscarriage at this time last year and hasn't been able to get pregnant since then. Cycles are 28-32 days apart. OPKs at home are positive. No history of PID, no longer smokes. Her partner does smoke but no h/o testicular trauma.      Past Medical History:   Diagnosis Date    Adverse reaction to birth control pills     vomiting with ortho-cyclen, every day X2 weeks    Back pain     with periods    Dysmenorrhea     Epilepsy ([address]     Menorrhagia       Past Surgical History:   Procedure Laterality Date    DILATION AND CURETTAGE OF UTERUS N/A 3/25/2020    DILATATION AND CURETTAGE SUCTION performed by Sima Juarez MD at [address] [facility]  age 4     Family History   Problem Relation Age of Onset    Other Paternal Uncle         Cancer-Smoker    Other Paternal Grandmother         Hysterectomy due to some type of cancer    Other Paternal Grandfather         DM2, COPD, MI X4, Carrier of Gene Chromosomal defect 15-21    Birth Defects Paternal Aunt 0        IUFD, during child birth, Genetic chormosonal defects    Other Paternal Aunt         multiple SAB related to choromosonal defect     Social History     Tobacco Use   Smoking Status Former Smoker    Quit date: 2015    Years since quittin.2   Smokeless Tobacco Never Used     Social History     Substance and Sexual Activity   Alcohol Use No     Current Outpatient Medications   Medication Sig Dispense Refill    ibuprofen (IBU) 600 MG tablet Take 1 tablet by mouth every 6 hours as needed for Pain 120 tablet 0    levETIRAcetam (KEPPRA) 500 MG tablet Take 1 tablet by mouth 2 times daily 60 tablet 3     No current facility-administered medications for this visit. Allergies:  Patient has no known allergies. Gynecologic History:  Patient's last menstrual period was 2021. Sexually Active: Yes  STD History: No      OB History    Para Term  AB Living   1 0 0 0 1 0   SAB TAB Ectopic Molar Multiple Live Births   1 0 0 0 0 0   Obstetric Comments   Incomplete ab with suction D&C 3/25/20       Review of Systems   Constitutional: Negative for chills and fever. HENT: Negative for hearing loss. Respiratory: Negative for cough and wheezing. Cardiovascular: Negative for chest pain and palpitations. Gastrointestinal: Negative for abdominal pain, constipation, diarrhea, nausea and vomiting. Genitourinary: Negative for dysuria, frequency and urgency. Musculoskeletal: Negative for myalgias. Skin: Negative for rash. Neurological: Negative for dizziness, weakness and headaches. Hematological: Does not bruise/bleed easily. Psychiatric/Behavioral: Negative for suicidal ideas. /78 (Position: Sitting, Cuff Size: Medium Adult)   Ht 5' 8\" (1.727 m)   Wt 154 lb (69.9 kg)   LMP 2021   BMI 23.42 kg/m²     Physical Exam  Constitutional:       Appearance: She is well-developed. HENT:      Head: Normocephalic. Neck:      Musculoskeletal: Normal range of motion. Thyroid: No thyromegaly. Cardiovascular:      Rate and Rhythm: Normal rate and regular rhythm. Pulmonary:      Effort: Pulmonary effort is normal. No respiratory distress. Abdominal:      General: There is no distension. Palpations: Abdomen is soft. Tenderness: There is no abdominal tenderness. Musculoskeletal: Normal range of motion. Skin:     General: Skin is warm and dry. Neurological:      Mental Status: She is alert and oriented to person, place, and time. Psychiatric:         Behavior: Behavior normal.         Thought Content:  Thought content normal. Judgment: Judgment normal.         ASSESSMENT:  Pamella Wetzel is a 26 y.o. female      ICD-10-CM    1. Family history of genetic disorder  Z84.89 Chromosome, Blood   2. Infertility counseling  Z31.69 Progesterone     US PELVIS COMPLETE NON-OB TRANSABDOMINAL AND TRANSVAGINAL     Anti Mullerian Hormone       PLAN:    Discussed infertility work up with [facility], USN, and semen analysis. Pt has a family history of a \"genetic disorder\" so would like genetic testing (chromosomes ordered). Reviewed she qualifies for infertility consult given that she's tried to conceive for 12 months. Consult information given for [facility].     Electronically signed by Callum Kelley MD on 3/8/2021 at 6:49 PM

## 2021-03-11 NOTE — ED NOTES
Pt presents to ed c/o vaginal bleeding that started yesterday which was minimal and she started cramping this morning and passing \"tissue\" and blood clots. MM pink and moist.  She is 7 weeks pregnant. She states the blood has slowed down at this time.       Mega Alfaro RN  03/06/20 0693 Hemostasis: Electrocautery

## 2021-04-13 ENCOUNTER — OFFICE VISIT (OUTPATIENT)
Dept: OBGYN CLINIC | Age: 26
End: 2021-04-13
Payer: COMMERCIAL

## 2021-04-13 VITALS
SYSTOLIC BLOOD PRESSURE: 102 MMHG | WEIGHT: 152 LBS | DIASTOLIC BLOOD PRESSURE: 64 MMHG | HEIGHT: 68 IN | BODY MASS INDEX: 23.04 KG/M2

## 2021-04-13 DIAGNOSIS — Z31.69 INFERTILITY COUNSELING: Primary | ICD-10-CM

## 2021-04-13 DIAGNOSIS — E28.2 PCOS (POLYCYSTIC OVARIAN SYNDROME): ICD-10-CM

## 2021-04-13 PROCEDURE — 99213 OFFICE O/P EST LOW 20 MIN: CPT | Performed by: OBSTETRICS & GYNECOLOGY

## 2021-04-13 ASSESSMENT — ENCOUNTER SYMPTOMS
VOMITING: 0
WHEEZING: 0
NAUSEA: 0
CONSTIPATION: 0
ABDOMINAL PAIN: 0
DIARRHEA: 0
COUGH: 0

## 2021-04-13 NOTE — PROGRESS NOTES
[address] [facility], [address] [place] [address]  DATE OF VISIT:  21    Claixto Malave    :  1995  CHIEF COMPLAINT:    Chief Complaint   Patient presents with    Results     3/30/21 Pelvic US for infertility        HPI :   Calixto Malave is a 26 y.o. female here for review of US results and to discuss infertility. US showed polycystic ovary on the right ovary (normal left ovary). Has not gotten labs yet. LMP 4/10/21. Past Medical History:   Diagnosis Date    Adverse reaction to birth control pills     vomiting with ortho-cyclen, every day X2 weeks    Back pain     with periods    Dysmenorrhea     Epilepsy ([address]     Menorrhagia       Past Surgical History:   Procedure Laterality Date    DILATION AND CURETTAGE OF UTERUS N/A 3/25/2020    DILATATION AND CURETTAGE SUCTION performed by Ivette Lan MD at [facility]  age 4     Family History   Problem Relation Age of Onset    Other Paternal Uncle         Cancer-Smoker    Other Paternal Grandmother         Hysterectomy due to some type of cancer    Other Paternal Grandfather         DM2, COPD, MI X4, Carrier of Gene Chromosomal defect 15-21    Birth Defects Paternal Aunt 0        IUFD, during child birth, Genetic chormosonal defects    Other Paternal Aunt         multiple SAB related to choromosonal defect     Social History     Tobacco Use   Smoking Status Former Smoker    Quit date: 2015    Years since quittin.3   Smokeless Tobacco Never Used     Social History     Substance and Sexual Activity   Alcohol Use No     Current Outpatient Medications   Medication Sig Dispense Refill    ibuprofen (IBU) 600 MG tablet Take 1 tablet by mouth every 6 hours as needed for Pain 120 tablet 0    levETIRAcetam (KEPPRA) 500 MG tablet Take 1 tablet by mouth 2 times daily 60 tablet 3     No current facility-administered medications for this visit. Allergies:  Patient has no known allergies.       OB History    Para Term  AB Living   1 0 0 0 1 0   SAB TAB Ectopic Molar Multiple Live Births   1 0 0 0 0 0   Obstetric Comments   Incomplete ab with suction D&C 3/25/20       Review of Systems   Constitutional: Negative for chills and fever. HENT: Negative for hearing loss. Respiratory: Negative for cough and wheezing. Cardiovascular: Negative for chest pain and palpitations. Gastrointestinal: Negative for abdominal pain, constipation, diarrhea, nausea and vomiting. Genitourinary: Negative for dysuria, frequency and urgency. Musculoskeletal: Negative for myalgias. Skin: Negative for rash. Neurological: Negative for dizziness, weakness and headaches. Hematological: Does not bruise/bleed easily. Psychiatric/Behavioral: Negative for suicidal ideas. /64 (Position: Sitting, Cuff Size: Medium Adult)   Ht 5' 8\" (1.727 m)   Wt 152 lb (68.9 kg)   LMP 2021   BMI 23.11 kg/m²     Physical Exam  Constitutional:       Appearance: She is well-developed. HENT:      Head: Normocephalic. Neck:      Musculoskeletal: Normal range of motion. Thyroid: No thyromegaly. Cardiovascular:      Rate and Rhythm: Normal rate and regular rhythm. Pulmonary:      Effort: Pulmonary effort is normal. No respiratory distress. Abdominal:      General: There is no distension. Palpations: Abdomen is soft. Tenderness: There is no abdominal tenderness. Musculoskeletal: Normal range of motion. Skin:     General: Skin is warm and dry. Neurological:      Mental Status: She is alert and oriented to person, place, and time. Psychiatric:         Behavior: Behavior normal.         Thought Content: Thought content normal.         Judgment: Judgment normal.         ASSESSMENT:  He Lemos is a 26 y.o. female      ICD-10-CM    1. Infertility counseling  Z31.69    2. PCOS (polycystic ovarian syndrome)  E28.2        PLAN:    Discussed next steps of HSG and partner semen analysis. Will get day 21 labs. Reviewed PCOS on US.     Electronically signed by Brayden Dawn MD on 4/13/2021 at 3:04 PM

## 2021-05-04 DIAGNOSIS — Z31.69 INFERTILITY COUNSELING: ICD-10-CM

## 2021-06-14 ENCOUNTER — HOSPITAL ENCOUNTER (EMERGENCY)
Age: 26
Discharge: HOME | End: 2021-06-14
Attending: EMERGENCY MEDICINE
Payer: COMMERCIAL

## 2021-06-14 ENCOUNTER — APPOINTMENT (OUTPATIENT)
Dept: GENERAL RADIOLOGY | Age: 26
End: 2021-06-14
Payer: COMMERCIAL

## 2021-06-14 VITALS
DIASTOLIC BLOOD PRESSURE: 80 MMHG | WEIGHT: 152 LBS | BODY MASS INDEX: 23.86 KG/M2 | RESPIRATION RATE: 14 BRPM | HEIGHT: 67 IN | OXYGEN SATURATION: 100 % | SYSTOLIC BLOOD PRESSURE: 123 MMHG | HEART RATE: 102 BPM | TEMPERATURE: 98.8 F

## 2021-06-14 DIAGNOSIS — J06.9 UPPER RESPIRATORY TRACT INFECTION, UNSPECIFIED TYPE: Primary | ICD-10-CM

## 2021-06-14 PROCEDURE — U0003 INFECTIOUS AGENT DETECTION BY NUCLEIC ACID (DNA OR RNA); SEVERE ACUTE RESPIRATORY SYNDROME CORONAVIRUS 2 (SARS-COV-2) (CORONAVIRUS DISEASE [COVID-19]), AMPLIFIED PROBE TECHNIQUE, MAKING USE OF HIGH THROUGHPUT TECHNOLOGIES AS DESCRIBED BY CMS-2020-01-R: HCPCS

## 2021-06-14 PROCEDURE — 71045 X-RAY EXAM CHEST 1 VIEW: CPT

## 2021-06-14 PROCEDURE — 99283 EMERGENCY DEPT VISIT LOW MDM: CPT

## 2021-06-14 PROCEDURE — U0005 INFEC AGEN DETEC AMPLI PROBE: HCPCS

## 2021-06-14 RX ORDER — AZITHROMYCIN 250 MG/1
TABLET, FILM COATED ORAL
Qty: 1 PACKET | Refills: 0 | Status: SHIPPED | OUTPATIENT
Start: 2021-06-14

## 2021-06-14 ASSESSMENT — ENCOUNTER SYMPTOMS
CONSTIPATION: 0
EYE REDNESS: 0
FACIAL SWELLING: 0
SHORTNESS OF BREATH: 0
ABDOMINAL PAIN: 0
COUGH: 1
COLOR CHANGE: 0
EYE DISCHARGE: 0
VOMITING: 0
DIARRHEA: 0

## 2021-06-14 ASSESSMENT — PAIN DESCRIPTION - PAIN TYPE: TYPE: ACUTE PAIN

## 2021-06-14 ASSESSMENT — PAIN SCALES - GENERAL: PAINLEVEL_OUTOF10: 5

## 2021-06-14 NOTE — ED PROVIDER NOTES
[facility]0 [facility] ED  EMERGENCY DEPARTMENT ENCOUNTER      Pt Name: Cici Kam  MRN: [id number]  [place] 1995  Date of evaluation: 2021  Provider: Solitario Keller MD    CHIEF COMPLAINT       Chief Complaint   Patient presents with    Chest Pain    Shortness of Breath         HISTORY OF PRESENT ILLNESS  (Location/Symptom, Timing/Onset, Context/Setting, Quality, Duration, Modifying Factors, Severity.)   Cici Kam is a 26 y.o. female who presents to the emergency department for a cough. She has had this for about 5 to 6 days and she has been coughing up some yellow to green-colored phlegm. No hemoptysis. She has not been vaccinated for Covid. She has not had a known fever and she rated the pain her chest as a 5. She had pneumonia about 2 years ago and is worried she might have it again. Nursing Notes were reviewed. ALLERGIES     Patient has no known allergies.     CURRENT MEDICATIONS       Previous Medications    IBUPROFEN (IBU) 600 MG TABLET    Take 1 tablet by mouth every 6 hours as needed for Pain    LEVETIRACETAM (KEPPRA) 500 MG TABLET    Take 1 tablet by mouth 2 times daily       PAST MEDICAL HISTORY         Diagnosis Date    Adverse reaction to birth control pills     vomiting with ortho-cyclen, every day X2 weeks    Back pain     with periods    Dysmenorrhea     Epilepsy ([facility] [address]     Menorrhagia        SURGICAL HISTORY           Procedure Laterality Date    DILATION AND CURETTAGE OF UTERUS N/A 3/25/2020    DILATATION AND CURETTAGE SUCTION performed by Pradeep Simon MD at [address] [facility]  age 4         FAMILY HISTORY           Problem Relation Age of Onset    Other Paternal Uncle         Cancer-Smoker    Other Paternal Grandmother         Hysterectomy due to some type of cancer    Other Paternal Grandfather         DM2, COPD, MI X4, Carrier of Gene Chromosomal defect 15-21    Birth Defects Paternal Aunt 0        IUFD, during child birth, Genetic chormosonal defects    Other Paternal Aunt         multiple SAB related to choromosonal defect     Family Status   Relation Name Status    PUnc  (Not Specified)    PGM  (Not Specified)    PGF  (Not Specified)    PAunt wayne     Yane  (Not Specified)        SOCIAL HISTORY      reports that she quit smoking about 5 years ago. She has never used smokeless tobacco. She reports previous drug use. Drug: Marijuana. She reports that she does not drink alcohol. REVIEW OF SYSTEMS    (2-9 systems for level 4, 10 or more for level 5)     Review of Systems   Constitutional: Negative for chills, fatigue and fever. HENT: Negative for congestion, ear discharge and facial swelling. Eyes: Negative for discharge and redness. Respiratory: Positive for cough. Negative for shortness of breath. Cardiovascular: Positive for chest pain. Gastrointestinal: Negative for abdominal pain, constipation, diarrhea and vomiting. Genitourinary: Negative for dysuria and hematuria. Musculoskeletal: Negative for arthralgias. Skin: Negative for color change and rash. Neurological: Negative for syncope, numbness and headaches. Hematological: Negative for adenopathy. Psychiatric/Behavioral: Negative for confusion. The patient is not nervous/anxious. Except as noted above the remainder of the review of systems was reviewed and negative. PHYSICAL EXAM    (up to 7 for level 4, 8 or more for level 5)     Vitals:    21 1433   BP: 123/80   Pulse: 102   Resp: 14   Temp: 98.8 °F (37.1 °C)   TempSrc: Oral   SpO2: 100%   Weight: 152 lb (68.9 kg)   Height: 5' 7\" (1.702 m)       Physical Exam  Vitals reviewed. Constitutional:       General: She is not in acute distress. Appearance: She is well-developed. She is not diaphoretic. HENT:      Head: Normocephalic and atraumatic. Eyes:      General: No scleral icterus. Right eye: No discharge. Left eye: No discharge. Cardiovascular:      Rate and Rhythm: Normal rate and regular rhythm. Pulmonary:      Effort: Pulmonary effort is normal. No respiratory distress. Breath sounds: Normal breath sounds. No stridor. No wheezing or rales. Abdominal:      General: There is no distension. Palpations: Abdomen is soft. Tenderness: There is no abdominal tenderness. Musculoskeletal:         General: Normal range of motion. Cervical back: Neck supple. Lymphadenopathy:      Cervical: No cervical adenopathy. Skin:     General: Skin is warm and dry. Findings: No erythema or rash. Neurological:      Mental Status: She is alert and oriented to person, place, and time. Psychiatric:         Behavior: Behavior normal.             DIAGNOSTIC RESULTS     EKG: All EKG's are interpreted by the Emergency Department Physician who either signs or Co-signs this chart in the absence of a cardiologist.    Not indicated    RADIOLOGY:   Non-plain film images such as CT, Ultrasound and MRI are read by the radiologist. Plain radiographic images are visualized and preliminarily interpreted by the emergency physician with the below findings:    Interpretation per the Radiologist below, if available at the time of this note:    XR CHEST PORTABLE    Result Date: 6/14/2021  EXAMINATION: ONE XRAY VIEW OF THE CHEST 6/14/2021 5:03 pm COMPARISON: 2 December 2018 HISTORY: ORDERING SYSTEM PROVIDED HISTORY: cough TECHNOLOGIST PROVIDED HISTORY: cough Reason for Exam: Pt states cough, sob x 6 days Acuity: Acute Type of Exam: Initial FINDINGS: AP upright view of the chest time stamped at 1700 hours is submitted. Heart size is normal.  No vascular congestion, focal consolidation, effusion, or pneumothorax is noted. Osseous and mediastinal structures are age-appropriate. No acute cardiopulmonary process. LABS:  Labs Reviewed   COVID-19       All other labs were within normal range or not returned as of this dictation.     EMERGENCY DEPARTMENT COURSE and DIFFERENTIAL DIAGNOSIS/MDM:   Vitals:    Vitals:    06/14/21 1433   BP: 123/80   Pulse: 102   Resp: 14   Temp: 98.8 °F (37.1 °C)   TempSrc: Oral   SpO2: 100%   Weight: 152 lb (68.9 kg)   Height: 5' 7\" (1.702 m)       Orders Placed This Encounter   Medications    azithromycin (ZITHROMAX) 250 MG tablet     Sig: Take 2 tablets (500 mg) on Day 1, followed by 1 tablet (250 mg) once daily on Days 2 through 5. Dispense:  1 packet     Refill:  0       Medical Decision Making: No evidence of pneumonia. Coronavirus test pending. Treatment diagnosis and follow-up were discussed with the patient. CONSULTS:  None    PROCEDURES:  None    FINAL IMPRESSION      1. Upper respiratory tract infection, unspecified type          DISPOSITION/PLAN   DISPOSITION Decision To Discharge 06/14/2021 05:29:34 PM      PATIENT REFERRED TO:   [facility] ED  [address] [facility]  [phone]    If symptoms worsen      DISCHARGE MEDICATIONS:     New Prescriptions    AZITHROMYCIN (ZITHROMAX) 250 MG TABLET    Take 2 tablets (500 mg) on Day 1, followed by 1 tablet (250 mg) once daily on Days 2 through 5.          (Please note that portions of this note were completed with a voice recognition program.  Efforts were made to edit the dictations but occasionally words are mis-transcribed.)    Carl Lugo MD  Attending Emergency Physician           Carl Lugo MD  06/14/21 5018

## 2021-06-14 NOTE — LETTER
[facility] ED  [address] [id number]  Phone: [phone]             Opal 15, 2021    Patient: Erna Landaverde   YOB: 1995   Date of Visit: 6/14/2021       To Whom It May Concern:    Erna Landaverde was seen and treated in our emergency department on 6/14/2021.  She may return to work on June 16,2021      Sincerely,             Signature:__________________________________

## 2021-06-15 LAB
SARS-COV-2: NORMAL
SARS-COV-2: NOT DETECTED
SOURCE: NORMAL

## 2021-06-17 DIAGNOSIS — Z84.89 FAMILY HISTORY OF GENETIC DISORDER: ICD-10-CM

## 2022-10-19 ENCOUNTER — APPOINTMENT (OUTPATIENT)
Dept: CT IMAGING | Age: 27
End: 2022-10-19

## 2022-10-19 ENCOUNTER — HOSPITAL ENCOUNTER (EMERGENCY)
Age: 27
Discharge: HOME | End: 2022-10-19
Attending: EMERGENCY MEDICINE

## 2022-10-19 VITALS
HEART RATE: 67 BPM | TEMPERATURE: 97.9 F | SYSTOLIC BLOOD PRESSURE: 121 MMHG | DIASTOLIC BLOOD PRESSURE: 76 MMHG | RESPIRATION RATE: 14 BRPM | WEIGHT: 160 LBS | BODY MASS INDEX: 25.06 KG/M2 | OXYGEN SATURATION: 98 %

## 2022-10-19 DIAGNOSIS — G40.919 BREAKTHROUGH SEIZURE (HCC): Primary | ICD-10-CM

## 2022-10-19 LAB
ABSOLUTE EOS #: <0.03 K/UL (ref 0–0.44)
ABSOLUTE IMMATURE GRANULOCYTE: 0.03 K/UL (ref 0–0.3)
ABSOLUTE LYMPH #: 0.78 K/UL (ref 1.1–3.7)
ABSOLUTE MONO #: 0.44 K/UL (ref 0.1–1.2)
ALBUMIN SERPL-MCNC: 4.6 G/DL (ref 3.5–5.2)
ALP BLD-CCNC: 67 U/L (ref 35–104)
ALT SERPL-CCNC: 15 U/L (ref 5–33)
ANION GAP SERPL CALCULATED.3IONS-SCNC: 13 MMOL/L (ref 9–17)
AST SERPL-CCNC: 22 U/L
BACTERIA: ABNORMAL
BASOPHILS # BLD: 0 % (ref 0–2)
BASOPHILS ABSOLUTE: 0.04 K/UL (ref 0–0.2)
BILIRUB SERPL-MCNC: 0.7 MG/DL (ref 0.3–1.2)
BILIRUBIN URINE: NEGATIVE
BUN BLDV-MCNC: 13 MG/DL (ref 6–20)
BUN/CREAT BLD: 20 (ref 9–20)
CALCIUM SERPL-MCNC: 9.3 MG/DL (ref 8.6–10.4)
CHLORIDE BLD-SCNC: 100 MMOL/L (ref 98–107)
CO2: 22 MMOL/L (ref 20–31)
COLOR: YELLOW
CREAT SERPL-MCNC: 0.66 MG/DL (ref 0.5–0.9)
EOSINOPHILS RELATIVE PERCENT: 0 % (ref 1–4)
EPITHELIAL CELLS UA: ABNORMAL /HPF (ref 0–5)
GFR SERPL CREATININE-BSD FRML MDRD: >60 ML/MIN/1.73M2
GLUCOSE BLD-MCNC: 95 MG/DL (ref 70–99)
GLUCOSE URINE: NEGATIVE
HCG(URINE) PREGNANCY TEST: NEGATIVE
HCT VFR BLD CALC: 41.4 % (ref 36.3–47.1)
HEMOGLOBIN: 12.7 G/DL (ref 11.9–15.1)
IMMATURE GRANULOCYTES: 0 %
KEPPRA: 6 UG/ML
KETONES, URINE: ABNORMAL
LEUKOCYTE ESTERASE, URINE: NEGATIVE
LYMPHOCYTES # BLD: 6 % (ref 24–43)
MCH RBC QN AUTO: 27 PG (ref 25.2–33.5)
MCHC RBC AUTO-ENTMCNC: 30.7 G/DL (ref 28.4–34.8)
MCV RBC AUTO: 88.1 FL (ref 82.6–102.9)
MONOCYTES # BLD: 4 % (ref 3–12)
MUCUS: ABNORMAL
NITRITE, URINE: NEGATIVE
NRBC AUTOMATED: 0 PER 100 WBC
PDW BLD-RTO: 12.6 % (ref 11.8–14.4)
PH UA: 6 (ref 5–8)
PLATELET # BLD: 273 K/UL (ref 138–453)
PMV BLD AUTO: 10.1 FL (ref 8.1–13.5)
POTASSIUM SERPL-SCNC: 3.9 MMOL/L (ref 3.7–5.3)
PROTEIN UA: NEGATIVE
RBC # BLD: 4.7 M/UL (ref 3.95–5.11)
RBC UA: ABNORMAL /HPF (ref 0–2)
SEG NEUTROPHILS: 90 % (ref 36–65)
SEGMENTED NEUTROPHILS ABSOLUTE COUNT: 11.33 K/UL (ref 1.5–8.1)
SODIUM BLD-SCNC: 135 MMOL/L (ref 135–144)
SPECIFIC GRAVITY UA: 1.02 (ref 1–1.03)
TOTAL PROTEIN: 7.2 G/DL (ref 6.4–8.3)
TURBIDITY: ABNORMAL
URINE HGB: ABNORMAL
UROBILINOGEN, URINE: NORMAL
WBC # BLD: 12.6 K/UL (ref 3.5–11.3)
WBC UA: ABNORMAL /HPF (ref 0–5)

## 2022-10-19 PROCEDURE — 6360000002 HC RX W HCPCS: Performed by: EMERGENCY MEDICINE

## 2022-10-19 PROCEDURE — 80177 DRUG SCRN QUAN LEVETIRACETAM: CPT

## 2022-10-19 PROCEDURE — 2580000003 HC RX 258: Performed by: EMERGENCY MEDICINE

## 2022-10-19 PROCEDURE — 99284 EMERGENCY DEPT VISIT MOD MDM: CPT

## 2022-10-19 PROCEDURE — 96375 TX/PRO/DX INJ NEW DRUG ADDON: CPT

## 2022-10-19 PROCEDURE — 70450 CT HEAD/BRAIN W/O DYE: CPT

## 2022-10-19 PROCEDURE — 85025 COMPLETE CBC W/AUTO DIFF WBC: CPT

## 2022-10-19 PROCEDURE — 80053 COMPREHEN METABOLIC PANEL: CPT

## 2022-10-19 PROCEDURE — 96374 THER/PROPH/DIAG INJ IV PUSH: CPT

## 2022-10-19 PROCEDURE — 81025 URINE PREGNANCY TEST: CPT

## 2022-10-19 PROCEDURE — 81001 URINALYSIS AUTO W/SCOPE: CPT

## 2022-10-19 RX ORDER — 0.9 % SODIUM CHLORIDE 0.9 %
1000 INTRAVENOUS SOLUTION INTRAVENOUS ONCE
Status: COMPLETED | OUTPATIENT
Start: 2022-10-19 | End: 2022-10-19

## 2022-10-19 RX ORDER — ONDANSETRON 2 MG/ML
4 INJECTION INTRAMUSCULAR; INTRAVENOUS ONCE
Status: COMPLETED | OUTPATIENT
Start: 2022-10-19 | End: 2022-10-19

## 2022-10-19 RX ADMIN — ONDANSETRON 4 MG: 2 INJECTION INTRAMUSCULAR; INTRAVENOUS at 18:07

## 2022-10-19 RX ADMIN — SODIUM CHLORIDE 1000 ML: 9 INJECTION, SOLUTION INTRAVENOUS at 18:05

## 2022-10-19 RX ADMIN — SODIUM CHLORIDE 1000 MG: 9 INJECTION, SOLUTION INTRAVENOUS at 18:12

## 2022-10-19 NOTE — ED PROVIDER NOTES
EMERGENCY DEPARTMENT ENCOUNTER    Pt Name: Gloria Melton  MRN: [id number]  [place] 1995  Date of evaluation: 10/19/22  CHIEF COMPLAINT       Chief Complaint   Patient presents with    Seizures     Last seizure was 4 months ago prior to these seizures. Had 2 today and 2 yesterday. Takes keppra     HISTORY OF PRESENT ILLNESS   Patient is a 56-year-old female with PMH of epilepsy on Keppra who presents to the ED after seizure activity. Patient and  report 2 seizures yesterday and 2 more seizures this morning.  reports that patient called out during sleep followed by a few minutes of shaking of her entire body followed by a postictal state lasting for approximately 30 minutes. Patient reports positive tongue trauma. No loss of control of bowel or bladder. Only complaint at this time is that she feels tired which is typical of her post seizure state. She has been taking her medications as prescribed. No other issues reported. No reports of fever, cough, shortness of breath, chest pain, abdominal pain. No urinary symptoms. REVIEW OF SYSTEMS     Review of Systems   All other systems reviewed and are negative.   PASTMEDICAL HISTORY     Past Medical History:   Diagnosis Date    Adverse reaction to birth control pills 2015    vomiting with ortho-cyclen, every day X2 weeks    Back pain     with periods    Dysmenorrhea     Epilepsy ([address]     Menorrhagia      SURGICAL HISTORY       Past Surgical History:   Procedure Laterality Date    DILATION AND CURETTAGE OF UTERUS N/A 3/25/2020    DILATATION AND CURETTAGE SUCTION performed by Ibis Craft MD at [address] [place] [address]  age 4     CURRENT MEDICATIONS       Discharge Medication List as of 10/19/2022  8:10 PM        CONTINUE these medications which have NOT CHANGED    Details   azithromycin (ZITHROMAX) 250 MG tablet Take 2 tablets (500 mg) on Day 1, followed by 1 tablet (250 mg) once daily on Days 2 through 5., Disp-1 packet, R-0Print      ibuprofen (IBU) 600 MG tablet Take 1 tablet by mouth every 6 hours as needed for Pain, Disp-120 tablet,R-0Print      levETIRAcetam (KEPPRA) 500 MG tablet Take 1 tablet by mouth 2 times daily, Disp-60 tablet, R-3Print           ALLERGIES     has No Known Allergies. FAMILY HISTORY     She indicated that the status of her paternal grandmother is unknown. She indicated that the status of her paternal grandfather is unknown. She indicated that one of her two paternal aunts is . She indicated that the status of her paternal uncle is unknown. SOCIAL HISTORY       Social History     Tobacco Use    Smoking status: Former     Types: Cigarettes     Quit date: 2015     Years since quittin.8    Smokeless tobacco: Never   Vaping Use    Vaping Use: Never used   Substance Use Topics    Alcohol use: No    Drug use: Not Currently     Types: Marijuana (Weed)     PHYSICAL EXAM     INITIAL VITALS: /76   Pulse 67   Temp 97.9 °F (36.6 °C)   Resp 14   Wt 160 lb (72.6 kg)   LMP 2022 (Approximate)   SpO2 98%   BMI 25.06 kg/m²    Physical Exam  Constitutional:       Appearance: Normal appearance. HENT:      Head: Normocephalic. Right Ear: External ear normal.      Left Ear: External ear normal.      Nose: Nose normal.      Mouth/Throat:      Mouth: Lacerations (Small shallow laceration tip of tongue.) present. Eyes:      Conjunctiva/sclera: Conjunctivae normal.   Cardiovascular:      Rate and Rhythm: Regular rhythm. Abdominal:      General: There is no distension. Skin:     General: Skin is dry. Neurological:      Mental Status: She is alert. Mental status is at baseline. MEDICAL DECISION MAKING:   The patient is hemodynamically stable, afebrile, nontoxic-appearing. Physical exam notable for small shallow laceration to tip of tongue. Based on history and exam unclear etiology of patient's breakthrough seizures.   ED plan for basic labs, Keppra level, head CT, Keppra load 1 g IV, reassess. ED Course as of 10/20/22 1509   Wed Oct 19, 2022   2107 Patient was signed out with a pending urinalysis. Patient was clear otherwise. Urinalysis did not display signs of a urinary tract infection. Dispo was set to discharge. [SH]      ED Course User Index  [SH] Teodoro DO Manuel     CRITICAL CARE:     The 30 ml/kg fluid bolus is not ordered due to concern for fluid overload and/or heart failure. PROCEDURES:    Procedures    DIAGNOSTIC RESULTS   EKG:All EKG's are interpreted by the Emergency Department Physician who either signs or Co-signs this chart in the absence of a cardiologist.        RADIOLOGY:All plain film, CT, MRI, and formal ultrasound images (except ED bedside ultrasound) are read by the radiologist, see reports below, unless otherwisenoted in MDM or here. CT HEAD WO CONTRAST   Final Result   No acute intracranial abnormality. LABS: All lab results were reviewed by myself, and all abnormals are listed below. Labs Reviewed   CBC WITH AUTO DIFFERENTIAL - Abnormal; Notable for the following components:       Result Value    WBC 12.6 (*)     Seg Neutrophils 90 (*)     Lymphocytes 6 (*)     Eosinophils % 0 (*)     Segs Absolute 11.33 (*)     Absolute Lymph # 0.78 (*)     All other components within normal limits   URINALYSIS - Abnormal; Notable for the following components:    Turbidity UA SLIGHTLY CLOUDY (*)     Ketones, Urine 3+ (*)     Urine Hgb 1+ (*)     All other components within normal limits   MICROSCOPIC URINALYSIS - Abnormal; Notable for the following components:    Bacteria, UA MODERATE (*)     Mucus, UA 1+ (*)     All other components within normal limits   COMPREHENSIVE METABOLIC PANEL   LEVETIRACETAM LEVEL   PREGNANCY, URINE       EMERGENCY DEPARTMENTCOURSE:   Patient did well in the ED. Labs unremarkable. CT head negative for acute pathology. Loaded with Keppra 1 g IV. For nitrates and leukocytes. UA does have bacteria with epithelial cells. Patient is asymptomatic, I will not treat for UTI at this time. Advised to follow-up with PCP. No further work-up indicated at this time. Nursing notes reviewed. At this time this is what I find, the patient appears well and does not appear sick or toxic. I gave my usual and customary discussion of the risks and benefits of discharge versus admission. I answered the patient's questions. I gave the patient strict return precautions. Patient expressed understanding of the discharge instructions. Vitals:    Vitals:    10/19/22 1409   BP: 121/76   Pulse: 67   Resp: 14   Temp: 97.9 °F (36.6 °C)   SpO2: 98%   Weight: 160 lb (72.6 kg)       The patient was given the following medications while in the emergency department:  Orders Placed This Encounter   Medications    ondansetron (ZOFRAN) injection 4 mg    0.9 % sodium chloride bolus    levETIRAcetam (KEPPRA) 1,000 mg in sodium chloride 0.9 % 100 mL IVPB     CONSULTS:  None    FINAL IMPRESSION      1. Breakthrough seizure [facility])          DISPOSITION/PLAN   DISPOSITION Decision To Discharge 10/19/2022 09:07:27 PM      PATIENT REFERRED TO:  No follow-up provider specified.   DISCHARGE MEDICATIONS:  Discharge Medication List as of 10/19/2022  8:10 PM        Maryann Granado MD  Attending Emergency Physician                    Brady Gant MD  10/20/22 9573

## 2022-10-19 NOTE — ED NOTES
Pt presents to ed c/o seizures. She had four within the last couple of days. She takes Keppra regularly but is concerned. She is alert and oriented. Boyfriend witnessed seizures.       Isacc Sadler RN  10/19/22 6866

## 2022-10-19 NOTE — ED TRIAGE NOTES
Last seizure 4 months ago prior to 2 today and 2 yesterday.  Reports change to pharmacy and dosage of keppra (increase to 1000 from 500 mg)

## 2023-08-17 ENCOUNTER — HOSPITAL ENCOUNTER (OUTPATIENT)
Age: 28
Setting detail: SPECIMEN
Discharge: HOME | End: 2023-08-17

## 2023-08-17 ENCOUNTER — OFFICE VISIT (OUTPATIENT)
Dept: OBGYN CLINIC | Age: 28
End: 2023-08-17
Payer: COMMERCIAL

## 2023-08-17 VITALS
SYSTOLIC BLOOD PRESSURE: 108 MMHG | BODY MASS INDEX: 25.91 KG/M2 | DIASTOLIC BLOOD PRESSURE: 78 MMHG | HEIGHT: 68 IN | WEIGHT: 171 LBS

## 2023-08-17 DIAGNOSIS — Z01.419 ENCOUNTER FOR GYNECOLOGICAL EXAMINATION: Primary | ICD-10-CM

## 2023-08-17 DIAGNOSIS — N94.6 DYSMENORRHEA: ICD-10-CM

## 2023-08-17 DIAGNOSIS — N97.9 INFERTILITY, FEMALE: ICD-10-CM

## 2023-08-17 DIAGNOSIS — Z87.42 HISTORY OF PCOS: ICD-10-CM

## 2023-08-17 PROCEDURE — 99395 PREV VISIT EST AGE 18-39: CPT

## 2023-08-17 RX ORDER — NAPROXEN 500 MG/1
500 TABLET ORAL 2 TIMES DAILY PRN
Qty: 60 TABLET | Refills: 1 | Status: SHIPPED | OUTPATIENT
Start: 2023-08-17

## 2023-08-17 NOTE — PROGRESS NOTES
Genitourinary:  Negative for decreased urine volume, difficulty urinating, dyspareunia, dysuria, frequency, hematuria, menstrual problem (infertility), urgency, vaginal discharge and vaginal pain. All other systems reviewed and are negative. Breast ROS: negative for new or changing breast lumps, abnormal pain, nipple discharge, or breast skin changes                 PHYSICAL EXAM:   Vitals:    08/17/23 1430   BP: 108/78   Position: Sitting   Cuff Size: Medium Adult   Weight: 171 lb (77.6 kg)   Height: 5' 8\" (1.727 m)      Physical Exam  Constitutional:       General: She is not in acute distress. Appearance: Normal appearance. She is well-groomed. Genitourinary:      Urethral meatus normal.      No lesions in the vagina. Right Labia: No rash, tenderness, lesions, skin changes or Bartholin's cyst.     Left Labia: No tenderness, lesions, skin changes, Bartholin's cyst or rash. No vaginal discharge or tenderness. No vaginal prolapse present. No vaginal atrophy present. Right Adnexa: not tender, not palpable and no mass present. Left Adnexa: not tender, not palpable and no mass present. No cervical motion tenderness, discharge, friability or lesion. Uterus is not tender or irregular. Rectum:      Rectal exam comments: Not assessed. Breasts:     Breasts are symmetrical.      Breasts are soft. Right: Present. No swelling, bleeding, inverted nipple, mass, nipple discharge, skin change, tenderness or breast implant. Left: Present. No swelling, bleeding, inverted nipple, mass, nipple discharge, skin change, tenderness or breast implant. Pulmonary:      Effort: Pulmonary effort is normal.   Lymphadenopathy:      Upper Body:      Right upper body: No supraclavicular or axillary adenopathy. Left upper body: No supraclavicular or axillary adenopathy.    Psychiatric:         Attention and Perception: Attention and perception normal.         Speech: Speech normal.

## 2023-08-17 NOTE — PATIENT INSTRUCTIONS
For heavy bleeding, Take scheduled 500 mg of naproxen/Aleve every 12 hours for the first 2-4 days of your menstrual cycle. Take with food. This should help decrease pain, menstrual flow heaviness and length of bleed time.

## 2023-08-29 LAB — CYTOLOGY REPORT: NORMAL

## 2023-09-19 ENCOUNTER — HOSPITAL ENCOUNTER (EMERGENCY)
Age: 28
Discharge: HOME | End: 2023-09-19
Attending: EMERGENCY MEDICINE
Payer: COMMERCIAL

## 2023-09-19 VITALS
DIASTOLIC BLOOD PRESSURE: 74 MMHG | SYSTOLIC BLOOD PRESSURE: 118 MMHG | WEIGHT: 160 LBS | OXYGEN SATURATION: 100 % | BODY MASS INDEX: 24.33 KG/M2 | RESPIRATION RATE: 16 BRPM | HEART RATE: 103 BPM | TEMPERATURE: 98.2 F

## 2023-09-19 DIAGNOSIS — Z76.0 ENCOUNTER FOR MEDICATION REFILL: Primary | ICD-10-CM

## 2023-09-19 PROCEDURE — 99283 EMERGENCY DEPT VISIT LOW MDM: CPT

## 2023-09-19 RX ORDER — LEVETIRACETAM 500 MG/1
500 TABLET ORAL
Qty: 150 TABLET | Refills: 0 | Status: SHIPPED | OUTPATIENT
Start: 2023-09-19 | End: 2023-09-20 | Stop reason: SDUPTHER

## 2023-09-19 NOTE — ED TRIAGE NOTES
Patient is here for Keppra prescription. Patient takes 1500mg in the morning and 1000mg at night time before bed. Patient is needing her medication refilled until she see's her new pcp on Wednesday at 0900. Dr. Kaila Sorto is her new pcp. Patient will then get referral to [address] [place] [address] neurology from there to manage seizures. Patient has no new complaints today. Last seizure was in November 2022.

## 2023-09-19 NOTE — ED PROVIDER NOTES
EMERGENCY DEPARTMENT ENCOUNTER    Pt Name: Elbert Kang  MRN: [id number]  [address] [facility] 1995  Date of evaluation: 9/19/23  CHIEF COMPLAINT       Chief Complaint   Patient presents with    Medication Refill     Keppra 1000mg 1 1/2 tabs in morning and 1 tab at night. Has apt this coming WED with PCP, pt neuroologist moved to [place]. Needs med for am dosing     HISTORY OF PRESENT ILLNESS   HPI   Patient is a 52-year-old female who presents to the ED requesting refill for her Keppra. Her neurologist moved. her PCP will fill her prescriptions but not until this Wednesday after clinic visit. She was able to take her Keppra today however does not have Keppra for tomorrow. She states she takes Keppra 1500 mg in the morning, and 1000 mg at night. REVIEW OF SYSTEMS     Review of Systems  All other systems reviewed and are negative. PASTMEDICAL HISTORY     Past Medical History:   Diagnosis Date    Adverse reaction to birth control pills 2015    vomiting with ortho-cyclen, every day X2 weeks    Back pain     with periods    Dysmenorrhea     Epilepsy ([address])     Menorrhagia      Past Problem List  Patient Active Problem List   Diagnosis Code    Dysmenorrhea N94.6    Menorrhagia N92.0    Back pain M54.9    Observed seizure-like activity ([address]) R56.9     SURGICAL HISTORY       Past Surgical History:   Procedure Laterality Date    DILATION AND CURETTAGE OF UTERUS N/A 3/25/2020    DILATATION AND CURETTAGE SUCTION performed by Melody Mojica MD at [address] [place] [address]  age 4     CURRENT MEDICATIONS       Current Discharge Medication List        CONTINUE these medications which have NOT CHANGED    Details   naproxen (NAPROSYN) 500 MG tablet Take 1 tablet by mouth 2 times daily as needed for Pain  Qty: 60 tablet, Refills: 1    Associated Diagnoses: Dysmenorrhea      azithromycin (ZITHROMAX) 250 MG tablet Take 2 tablets (500 mg) on Day 1, followed by 1 tablet (250 mg) once daily on Days 2 through 5.   Qty: 1

## 2023-09-20 ENCOUNTER — OFFICE VISIT (OUTPATIENT)
Dept: PRIMARY CARE CLINIC | Age: 28
End: 2023-09-20
Payer: COMMERCIAL

## 2023-09-20 VITALS
HEART RATE: 68 BPM | BODY MASS INDEX: 26.61 KG/M2 | OXYGEN SATURATION: 95 % | HEIGHT: 68 IN | DIASTOLIC BLOOD PRESSURE: 68 MMHG | SYSTOLIC BLOOD PRESSURE: 100 MMHG | WEIGHT: 175.6 LBS

## 2023-09-20 DIAGNOSIS — Z76.89 ENCOUNTER TO ESTABLISH CARE: Primary | ICD-10-CM

## 2023-09-20 DIAGNOSIS — Z13.9 DUE FOR SCREENING: ICD-10-CM

## 2023-09-20 DIAGNOSIS — F43.23 ADJUSTMENT DISORDER WITH MIXED ANXIETY AND DEPRESSED MOOD: ICD-10-CM

## 2023-09-20 DIAGNOSIS — R56.9 OBSERVED SEIZURE-LIKE ACTIVITY (HCC): ICD-10-CM

## 2023-09-20 PROCEDURE — 99204 OFFICE O/P NEW MOD 45 MIN: CPT | Performed by: NURSE PRACTITIONER

## 2023-09-20 RX ORDER — ESCITALOPRAM OXALATE 10 MG/1
10 TABLET ORAL DAILY
Qty: 30 TABLET | Refills: 3 | Status: SHIPPED | OUTPATIENT
Start: 2023-09-20

## 2023-09-20 RX ORDER — LEVETIRACETAM 500 MG/1
500 TABLET ORAL
Qty: 150 TABLET | Refills: 0 | Status: SHIPPED | OUTPATIENT
Start: 2023-09-20 | End: 2023-10-20

## 2023-09-20 RX ORDER — LEVETIRACETAM 1000 MG/1
TABLET ORAL
COMMUNITY
Start: 2023-08-06 | End: 2023-09-20

## 2023-09-20 SDOH — ECONOMIC STABILITY: HOUSING INSECURITY
IN THE LAST 12 MONTHS, WAS THERE A TIME WHEN YOU DID NOT HAVE A STEADY PLACE TO SLEEP OR SLEPT IN A SHELTER (INCLUDING NOW)?: NO

## 2023-09-20 SDOH — ECONOMIC STABILITY: INCOME INSECURITY: HOW HARD IS IT FOR YOU TO PAY FOR THE VERY BASICS LIKE FOOD, HOUSING, MEDICAL CARE, AND HEATING?: NOT HARD AT ALL

## 2023-09-20 SDOH — ECONOMIC STABILITY: FOOD INSECURITY: WITHIN THE PAST 12 MONTHS, THE FOOD YOU BOUGHT JUST DIDN'T LAST AND YOU DIDN'T HAVE MONEY TO GET MORE.: NEVER TRUE

## 2023-09-20 SDOH — ECONOMIC STABILITY: FOOD INSECURITY: WITHIN THE PAST 12 MONTHS, YOU WORRIED THAT YOUR FOOD WOULD RUN OUT BEFORE YOU GOT MONEY TO BUY MORE.: NEVER TRUE

## 2023-09-20 ASSESSMENT — ENCOUNTER SYMPTOMS
SINUS PRESSURE: 0
ABDOMINAL PAIN: 0
SINUS PAIN: 0
COUGH: 0
COLOR CHANGE: 0
BACK PAIN: 0
DIARRHEA: 0
SHORTNESS OF BREATH: 0
SORE THROAT: 0
CHEST TIGHTNESS: 0
VOMITING: 0
NAUSEA: 0
PHOTOPHOBIA: 0

## 2023-09-20 ASSESSMENT — PATIENT HEALTH QUESTIONNAIRE - PHQ9
1. LITTLE INTEREST OR PLEASURE IN DOING THINGS: 0
2. FEELING DOWN, DEPRESSED OR HOPELESS: 0
SUM OF ALL RESPONSES TO PHQ QUESTIONS 1-9: 0
SUM OF ALL RESPONSES TO PHQ9 QUESTIONS 1 & 2: 0
SUM OF ALL RESPONSES TO PHQ QUESTIONS 1-9: 0

## 2023-09-20 NOTE — PROGRESS NOTES
[address] [facility] PRIMARY CARE  [id number] [facility] [address]  Dept: [address] José Waddell is a 33 y.o. female who presents today for her  medical conditions/complaintsas noted below. Lilly Castro is c/o of New Patient and Establish Care      HPI:     HPI    This is a 27-year-old female with underlying history of seizure disorder and anxiety/depression who presents today to establish care. Patient is needing to also establish with a new neurologist due to previous provider no longer excepting her insurance. Patient is currently on Keppra 1500 mg in the morning and at 1000 mg in the evening for seizure control. She did present to the emergency room yesterday for medication refills. Seizure reported as November, 2022. Patient is planning on applying for her 's license as it has been over 6-month since her last seizure. Patiently, patient endorses underlying anxiety and depression. Was previously on Lexapro but has been off the medication for approximately the last year. She is requesting restarting medication as she feels as though her anxiety and depression symptoms are building. She is actively planning a wedding and get  on November 1. Healthcare maintenance measures addressed. Labs have been ordered accordingly. Vaccination status discussed and patient has declined both her Tdap and influenza vaccine today. She is established with a gynecologist and has an upcoming appointment for cervical cancer screening. Will refill current doses of Keppra until she is able to be evaluated by neurology. [facility] neurology referral provided. Also agree to restart patient on 10 mg escitalopram for control of anxiety/depressive symptoms. Patient education provided. We will have patient return in 3 months.     Past Medical History:   Diagnosis Date    Adverse reaction to birth control pills 2015    vomiting with

## 2023-11-13 ENCOUNTER — OFFICE VISIT (OUTPATIENT)
Dept: NEUROLOGY | Age: 28
End: 2023-11-13
Payer: COMMERCIAL

## 2023-11-13 ENCOUNTER — TELEPHONE (OUTPATIENT)
Dept: NEUROLOGY | Age: 28
End: 2023-11-13

## 2023-11-13 VITALS
HEART RATE: 71 BPM | HEIGHT: 68 IN | DIASTOLIC BLOOD PRESSURE: 74 MMHG | BODY MASS INDEX: 26.52 KG/M2 | SYSTOLIC BLOOD PRESSURE: 111 MMHG | WEIGHT: 175 LBS

## 2023-11-13 DIAGNOSIS — G40.909 SEIZURE DISORDER (HCC): Primary | ICD-10-CM

## 2023-11-13 PROCEDURE — 99203 OFFICE O/P NEW LOW 30 MIN: CPT | Performed by: PHYSICIAN ASSISTANT

## 2023-11-13 RX ORDER — LEVETIRACETAM 500 MG/1
TABLET ORAL
Qty: 150 TABLET | Refills: 11 | Status: SHIPPED | OUTPATIENT
Start: 2023-11-13

## 2023-11-13 NOTE — TELEPHONE ENCOUNTER
I called [facility] at [phone] and spoke to Nghia and the policy is active.   KS

## 2024-01-03 ENCOUNTER — HOSPITAL ENCOUNTER (EMERGENCY)
Age: 29
Discharge: HOME | End: 2024-01-03
Attending: EMERGENCY MEDICINE
Payer: COMMERCIAL

## 2024-01-03 ENCOUNTER — APPOINTMENT (OUTPATIENT)
Dept: GENERAL RADIOLOGY | Age: 29
End: 2024-01-03
Payer: COMMERCIAL

## 2024-01-03 VITALS
RESPIRATION RATE: 12 BRPM | SYSTOLIC BLOOD PRESSURE: 112 MMHG | HEART RATE: 87 BPM | DIASTOLIC BLOOD PRESSURE: 65 MMHG | BODY MASS INDEX: 27.37 KG/M2 | TEMPERATURE: 98.1 F | OXYGEN SATURATION: 100 % | WEIGHT: 180 LBS

## 2024-01-03 DIAGNOSIS — R53.83 OTHER FATIGUE: ICD-10-CM

## 2024-01-03 DIAGNOSIS — R11.0 NAUSEA: ICD-10-CM

## 2024-01-03 DIAGNOSIS — Z86.16 HISTORY OF COVID-19: Primary | ICD-10-CM

## 2024-01-03 LAB
ANION GAP SERPL CALCULATED.3IONS-SCNC: 20 MMOL/L (ref 9–17)
BASOPHILS # BLD: 0.03 K/UL (ref 0–0.2)
BASOPHILS NFR BLD: 0 % (ref 0–2)
BUN SERPL-MCNC: 13 MG/DL (ref 6–20)
BUN/CREAT SERPL: 22 (ref 9–20)
CALCIUM SERPL-MCNC: 9.4 MG/DL (ref 8.6–10.4)
CHLORIDE SERPL-SCNC: 95 MMOL/L (ref 98–107)
CO2 SERPL-SCNC: 19 MMOL/L (ref 20–31)
CREAT SERPL-MCNC: 0.6 MG/DL (ref 0.5–0.9)
D DIMER PPP FEU-MCNC: 0.29 UG/ML FEU (ref 0–0.59)
EOSINOPHIL # BLD: 0.03 K/UL (ref 0–0.44)
EOSINOPHILS RELATIVE PERCENT: 0 % (ref 1–4)
ERYTHROCYTE [DISTWIDTH] IN BLOOD BY AUTOMATED COUNT: 12 % (ref 11.8–14.4)
GFR SERPL CREATININE-BSD FRML MDRD: >60 ML/MIN/1.73M2
GLUCOSE SERPL-MCNC: 64 MG/DL (ref 70–99)
HCT VFR BLD AUTO: 44.3 % (ref 36.3–47.1)
HGB BLD-MCNC: 14.6 G/DL (ref 11.9–15.1)
IMM GRANULOCYTES # BLD AUTO: 0.02 K/UL (ref 0–0.3)
IMM GRANULOCYTES NFR BLD: 0 %
LYMPHOCYTES NFR BLD: 1.41 K/UL (ref 1.1–3.7)
LYMPHOCYTES RELATIVE PERCENT: 18 % (ref 24–43)
MCH RBC QN AUTO: 27.4 PG (ref 25.2–33.5)
MCHC RBC AUTO-ENTMCNC: 33 G/DL (ref 28.4–34.8)
MCV RBC AUTO: 83.1 FL (ref 82.6–102.9)
MONOCYTES NFR BLD: 0.58 K/UL (ref 0.1–1.2)
MONOCYTES NFR BLD: 8 % (ref 3–12)
NEUTROPHILS NFR BLD: 74 % (ref 36–65)
NEUTS SEG NFR BLD: 5.67 K/UL (ref 1.5–8.1)
NRBC BLD-RTO: 0 PER 100 WBC
PLATELET # BLD AUTO: 257 K/UL (ref 138–453)
PMV BLD AUTO: 10.7 FL (ref 8.1–13.5)
POTASSIUM SERPL-SCNC: 3.8 MMOL/L (ref 3.7–5.3)
RBC # BLD AUTO: 5.33 M/UL (ref 3.95–5.11)
SODIUM SERPL-SCNC: 134 MMOL/L (ref 135–144)
WBC OTHER # BLD: 7.7 K/UL (ref 3.5–11.3)

## 2024-01-03 PROCEDURE — 85025 COMPLETE CBC W/AUTO DIFF WBC: CPT

## 2024-01-03 PROCEDURE — 99285 EMERGENCY DEPT VISIT HI MDM: CPT

## 2024-01-03 PROCEDURE — 85379 FIBRIN DEGRADATION QUANT: CPT

## 2024-01-03 PROCEDURE — 71045 X-RAY EXAM CHEST 1 VIEW: CPT

## 2024-01-03 PROCEDURE — 80048 BASIC METABOLIC PNL TOTAL CA: CPT

## 2024-01-03 PROCEDURE — 93005 ELECTROCARDIOGRAM TRACING: CPT | Performed by: EMERGENCY MEDICINE

## 2024-01-03 PROCEDURE — 2580000003 HC RX 258: Performed by: PHYSICIAN ASSISTANT

## 2024-01-03 RX ORDER — ONDANSETRON 4 MG/1
4 TABLET, ORALLY DISINTEGRATING ORAL EVERY 8 HOURS PRN
Qty: 21 TABLET | Refills: 0 | Status: SHIPPED | OUTPATIENT
Start: 2024-01-03 | End: 2024-01-10

## 2024-01-03 RX ORDER — BENZONATATE 100 MG/1
100 CAPSULE ORAL 3 TIMES DAILY PRN
Qty: 30 CAPSULE | Refills: 0 | Status: SHIPPED | OUTPATIENT
Start: 2024-01-03 | End: 2024-01-10

## 2024-01-03 RX ORDER — ALBUTEROL SULFATE 90 UG/1
2 AEROSOL, METERED RESPIRATORY (INHALATION) EVERY 4 HOURS PRN
Qty: 8.5 G | Refills: 0 | Status: SHIPPED | OUTPATIENT
Start: 2024-01-03

## 2024-01-03 RX ORDER — 0.9 % SODIUM CHLORIDE 0.9 %
1000 INTRAVENOUS SOLUTION INTRAVENOUS ONCE
Status: COMPLETED | OUTPATIENT
Start: 2024-01-03 | End: 2024-01-03

## 2024-01-03 RX ADMIN — SODIUM CHLORIDE 1000 ML: 9 INJECTION, SOLUTION INTRAVENOUS at 20:28

## 2024-01-03 ASSESSMENT — PAIN DESCRIPTION - ORIENTATION: ORIENTATION: LEFT;MID

## 2024-01-03 ASSESSMENT — PAIN DESCRIPTION - LOCATION: LOCATION: CHEST

## 2024-01-03 ASSESSMENT — PAIN SCALES - GENERAL: PAINLEVEL_OUTOF10: 4

## 2024-01-03 ASSESSMENT — PAIN - FUNCTIONAL ASSESSMENT: PAIN_FUNCTIONAL_ASSESSMENT: 0-10

## 2024-01-03 ASSESSMENT — PAIN DESCRIPTION - DESCRIPTORS: DESCRIPTORS: PRESSURE

## 2024-01-04 LAB
EKG ATRIAL RATE: 87 BPM
EKG P AXIS: 61 DEGREES
EKG P-R INTERVAL: 132 MS
EKG Q-T INTERVAL: 378 MS
EKG QRS DURATION: 100 MS
EKG QTC CALCULATION (BAZETT): 454 MS
EKG R AXIS: 67 DEGREES
EKG T AXIS: 22 DEGREES
EKG VENTRICULAR RATE: 87 BPM

## 2024-01-04 NOTE — ED PROVIDER NOTES
eMERGENCY dEPARTMENT eNCOUnter   Independent Attestation     Pt Name: Umm Marcus  MRN: [id number]  Birthdate 1995  Date of evaluation: 1/3/24     Umm Marcus is a 28 y.o. female with CC: Palpitations and Fatigue (Pt reports testing positive for covid on 12/27. Reports palpitations on and off since Friday. Reports family history of heart problems.)      This visit was performed by both a physician and an APC. I performed all aspects of the MDM as documented. Based on the medical record the care appears appropriate.  I was personally available for consultation in the Emergency Department.    The care is provided during an unprecedented national emergency due to the novel coronavirus, COVID 19.    Vivek Capps MD  Attending Emergency Physician                  Vivek Capps MD  01/03/24 2042    
Behavior normal.                 DIAGNOSTIC RESULTS     EKG: All EKG's are interpreted by the Emergency Department Physician who either signs or Co-signs this chart in the absence of a cardiologist.  No acute findings.  No ST segment elevation.      RADIOLOGY:   Non-plain film images such as CT, Ultrasound and MRI are read by the radiologist. Plain radiographic images arevisualized and preliminarily interpreted by the emergency physician with the below findings:        Interpretation per the Radiologist below, if available at thetime of this note:          ED BEDSIDE ULTRASOUND:   Performed by ED Physician - none    LABS:  Labs Reviewed   CBC WITH AUTO DIFFERENTIAL - Abnormal; Notable for the following components:       Result Value    RBC 5.33 (*)     Neutrophils % 74 (*)     Lymphocytes % 18 (*)     Eosinophils % 0 (*)     All other components within normal limits   BASIC METABOLIC PANEL - Abnormal; Notable for the following components:    Sodium 134 (*)     Chloride 95 (*)     CO2 19 (*)     Anion Gap 20 (*)     Glucose 64 (*)     Bun/Cre Ratio 22 (*)     All other components within normal limits   D-DIMER, QUANTITATIVE       All other labs were within normal range or not returned as of this dictation.    EMERGENCY DEPARTMENT COURSE and DIFFERENTIAL DIAGNOSIS/MDM:   Vitals:    Vitals:    01/03/24 2016 01/03/24 2030 01/03/24 2100 01/03/24 2130   BP: 111/71 102/70 102/81 112/65   Pulse: 89 80 90 87   Resp: 12 18 10 12   Temp:       TempSrc:       SpO2: 100% 97% 100% 100%   Weight:         HEARTSCORE:0    Patient is a 28-year-old female presenting with recent COVID-19 diagnosis along with nausea and chest pain.  D-dimer is negative.  Doubt pulm embolism.  X-ray of the chest is negative.  Doubt pneumonia.  Patient discharged home with symptomatic management which includes Zofran for nausea Tessalon and inhaler.  Also given a work note.    Evaluation and treatment course in the ED, and plan of care upon discharge was

## 2024-01-04 NOTE — ED NOTES
Pt to ED via private auto with c/o palpitations and fatigue. Pt states she tested positive for COVID on 12/27 and since then has felt fatigued. Pt states she started having palpitations and left sided chest pain on Friday. Pt states concern d/t family cardiac hx. Pt placed on cardiac monitor. Pt is A&Ox4. Pt's respirations are even and non-labored. ZEKE Villafana at pt's bedside.

## 2024-03-28 ENCOUNTER — APPOINTMENT (OUTPATIENT)
Dept: GENERAL RADIOLOGY | Age: 29
End: 2024-03-28
Payer: COMMERCIAL

## 2024-03-28 ENCOUNTER — HOSPITAL ENCOUNTER (EMERGENCY)
Age: 29
Discharge: HOME | End: 2024-03-28
Attending: STUDENT IN AN ORGANIZED HEALTH CARE EDUCATION/TRAINING PROGRAM
Payer: COMMERCIAL

## 2024-03-28 VITALS
RESPIRATION RATE: 12 BRPM | HEART RATE: 64 BPM | WEIGHT: 160 LBS | OXYGEN SATURATION: 100 % | SYSTOLIC BLOOD PRESSURE: 121 MMHG | DIASTOLIC BLOOD PRESSURE: 85 MMHG | TEMPERATURE: 98 F | BODY MASS INDEX: 24.25 KG/M2 | HEIGHT: 68 IN

## 2024-03-28 DIAGNOSIS — S93.402A SPRAIN OF LEFT ANKLE, UNSPECIFIED LIGAMENT, INITIAL ENCOUNTER: Primary | ICD-10-CM

## 2024-03-28 DIAGNOSIS — L03.116 CELLULITIS OF LEFT ANKLE: ICD-10-CM

## 2024-03-28 PROCEDURE — 73610 X-RAY EXAM OF ANKLE: CPT

## 2024-03-28 PROCEDURE — 99283 EMERGENCY DEPT VISIT LOW MDM: CPT

## 2024-03-28 PROCEDURE — 73630 X-RAY EXAM OF FOOT: CPT

## 2024-03-28 PROCEDURE — 6370000000 HC RX 637 (ALT 250 FOR IP): Performed by: NURSE PRACTITIONER

## 2024-03-28 RX ORDER — DOXYCYCLINE 100 MG/1
100 CAPSULE ORAL ONCE
Status: COMPLETED | OUTPATIENT
Start: 2024-03-28 | End: 2024-03-28

## 2024-03-28 RX ORDER — IBUPROFEN 800 MG/1
800 TABLET ORAL ONCE
Status: COMPLETED | OUTPATIENT
Start: 2024-03-28 | End: 2024-03-28

## 2024-03-28 RX ORDER — DOXYCYCLINE HYCLATE 100 MG
100 TABLET ORAL 2 TIMES DAILY
Qty: 14 TABLET | Refills: 0 | Status: SHIPPED | OUTPATIENT
Start: 2024-03-28 | End: 2024-04-04

## 2024-03-28 RX ORDER — IBUPROFEN 800 MG/1
800 TABLET ORAL EVERY 8 HOURS PRN
Qty: 20 TABLET | Refills: 0 | Status: SHIPPED | OUTPATIENT
Start: 2024-03-28

## 2024-03-28 RX ADMIN — IBUPROFEN 800 MG: 800 TABLET ORAL at 21:34

## 2024-03-28 RX ADMIN — DOXYCYCLINE 100 MG: 100 CAPSULE ORAL at 21:34

## 2024-03-28 ASSESSMENT — ENCOUNTER SYMPTOMS: COLOR CHANGE: 1

## 2024-03-28 ASSESSMENT — PAIN - FUNCTIONAL ASSESSMENT: PAIN_FUNCTIONAL_ASSESSMENT: 0-10

## 2024-03-28 ASSESSMENT — PAIN DESCRIPTION - LOCATION: LOCATION: ANKLE

## 2024-03-28 ASSESSMENT — VISUAL ACUITY: OU: 1

## 2024-03-28 ASSESSMENT — PAIN SCALES - GENERAL: PAINLEVEL_OUTOF10: 4

## 2024-03-28 ASSESSMENT — PAIN DESCRIPTION - ORIENTATION: ORIENTATION: LEFT

## 2024-03-29 NOTE — ED PROVIDER NOTES
Imaging was reviewed and reported by the radiologist. Results showed x-rays left ankle left foot negative for acute osseous abnormality.  Exam shows ankle sprain and she has a small area of cellulitis with there is a scab to left side of her ankle.  No abscess.  Do not suspect septic joint.  She is afebrile.  An Ace wrap and Aircast was placed to left ankle by the nurse.  Patient given crutches for comfort.  Will place antibiotics and anti-inflammatories.  Discussed imaging results, diagnosis treatment follow-up care with the patient.      Evaluation and treatment course in the ED, and plan of care upon discharge was discussed in length with the patient. Patient had no further questions prior to being discharged and was instructed to return to the ED for new or worsening symptoms.          CONSULTS:  None    PROCEDURES:  Procedures    FINAL IMPRESSION      1. Sprain of left ankle, unspecified ligament, initial encounter    2. Cellulitis of left ankle            Problem List  Patient Active Problem List   Diagnosis Code    Dysmenorrhea N94.6    Menorrhagia N92.0    Back pain M54.9    Observed seizure-like activity (HCC) R56.9         DISPOSITION/PLAN   DISPOSITION Decision To Discharge 03/28/2024 10:11:09 PM      PATIENT REFERRED TO:   Miracle Keith, APRN - CNP  [address] [facility] [facility]   Main [address]  [place] [id number]  [phone]    In 1 week      Davidson Manzo DPM  [address]  [address]  [place] [id number] [phone]    In 1 week      [facility]  [address] [place] [address]  [phone]    If symptoms worsen      DISCHARGE MEDICATIONS:     New Prescriptions    DOXYCYCLINE HYCLATE (VIBRA-TABS) 100 MG TABLET    Take 1 tablet by mouth 2 times daily for 7 days    IBUPROFEN (ADVIL;MOTRIN) 800 MG TABLET    Take 1 tablet by mouth every 8 hours as needed for Pain           (Please note that portions of this note were completed with a voice recognition program.  Efforts were made to edit the

## 2024-03-29 NOTE — DISCHARGE INSTRUCTIONS
Take medication as prescribed.  Keep ankle elevated while at rest and ice affected area as needed.  Take Motrin for pain.  Follow-up with your primary care provider in 1 week.  You may also follow-up with podiatrist provided if symptoms last longer than a week.  Return to emergency department for worsening or new symptoms.

## 2024-04-18 ENCOUNTER — OFFICE VISIT (OUTPATIENT)
Dept: PRIMARY CARE CLINIC | Age: 29
End: 2024-04-18
Payer: COMMERCIAL

## 2024-04-18 VITALS
HEIGHT: 68 IN | SYSTOLIC BLOOD PRESSURE: 99 MMHG | OXYGEN SATURATION: 98 % | BODY MASS INDEX: 27.61 KG/M2 | HEART RATE: 67 BPM | WEIGHT: 182.2 LBS | DIASTOLIC BLOOD PRESSURE: 65 MMHG

## 2024-04-18 DIAGNOSIS — Z09 HOSPITAL DISCHARGE FOLLOW-UP: Primary | ICD-10-CM

## 2024-04-18 DIAGNOSIS — R94.31 ABNORMAL EKG: ICD-10-CM

## 2024-04-18 DIAGNOSIS — S93.402D SPRAIN OF LEFT ANKLE, UNSPECIFIED LIGAMENT, SUBSEQUENT ENCOUNTER: ICD-10-CM

## 2024-04-18 PROCEDURE — 1111F DSCHRG MED/CURRENT MED MERGE: CPT | Performed by: NURSE PRACTITIONER

## 2024-04-18 PROCEDURE — 99212 OFFICE O/P EST SF 10 MIN: CPT | Performed by: NURSE PRACTITIONER

## 2024-04-18 ASSESSMENT — PATIENT HEALTH QUESTIONNAIRE - PHQ9
SUM OF ALL RESPONSES TO PHQ QUESTIONS 1-9: 0
SUM OF ALL RESPONSES TO PHQ QUESTIONS 1-9: 0
SUM OF ALL RESPONSES TO PHQ9 QUESTIONS 1 & 2: 0
2. FEELING DOWN, DEPRESSED OR HOPELESS: NOT AT ALL
1. LITTLE INTEREST OR PLEASURE IN DOING THINGS: NOT AT ALL
SUM OF ALL RESPONSES TO PHQ QUESTIONS 1-9: 0
SUM OF ALL RESPONSES TO PHQ QUESTIONS 1-9: 0

## 2024-04-18 ASSESSMENT — ENCOUNTER SYMPTOMS
VOMITING: 0
ABDOMINAL PAIN: 0
SINUS PRESSURE: 0
SHORTNESS OF BREATH: 0
PHOTOPHOBIA: 0
NAUSEA: 0
BACK PAIN: 0
SORE THROAT: 0
SINUS PAIN: 0
CHEST TIGHTNESS: 0
COLOR CHANGE: 0
DIARRHEA: 0
COUGH: 0

## 2024-04-18 NOTE — PROGRESS NOTES
Mental Status: She is alert and oriented to person, place, and time. Mental status is at baseline.      Motor: No weakness.      Gait: Gait normal.   Psychiatric:         Behavior: Behavior normal.                  An electronic signature was used to authenticate this note.    --KATELIN Rios - CNP

## 2024-07-29 RX ORDER — LEVETIRACETAM 500 MG/1
TABLET ORAL
Qty: 150 TABLET | Refills: 5 | Status: SHIPPED | OUTPATIENT
Start: 2024-07-29

## 2024-11-13 ENCOUNTER — OFFICE VISIT (OUTPATIENT)
Dept: NEUROLOGY | Age: 29
End: 2024-11-13
Payer: COMMERCIAL

## 2024-11-13 VITALS
HEART RATE: 105 BPM | WEIGHT: 185.6 LBS | BODY MASS INDEX: 28.13 KG/M2 | DIASTOLIC BLOOD PRESSURE: 78 MMHG | SYSTOLIC BLOOD PRESSURE: 114 MMHG | HEIGHT: 68 IN

## 2024-11-13 DIAGNOSIS — G40.909 SEIZURE DISORDER (HCC): Primary | ICD-10-CM

## 2024-11-13 PROCEDURE — 99214 OFFICE O/P EST MOD 30 MIN: CPT | Performed by: PHYSICIAN ASSISTANT

## 2024-11-13 RX ORDER — LEVETIRACETAM 500 MG/1
TABLET ORAL
Qty: 180 TABLET | Refills: 5 | Status: SHIPPED | OUTPATIENT
Start: 2024-11-13

## 2024-11-13 NOTE — PROGRESS NOTES
ROM adequate   Lungs:   Respirations unlabored   Heart:  Regular rate    Extremities: Extremities normal, no cyanosis, no edema   Skin: Skin color, texture normal, no rashes, no lesions       Mental status    Alert and oriented x 3; intact memory with no confusion, speech or language problems; no hallucinations or delusions     Cranial nerves    II - visual fields intact to confrontation  III, IV, VI - extra-ocular muscles full: no pupillary defect; no LARRY, no nystagmus, no ptosis   V - normal facial sensation                                                               VII - normal facial symmetry                                                             VIII - intact hearing                                                                             IX, X - symmetrical palate                                                                  XI - symmetrical shoulder shrug                                                       XII - tongue midline without atrophy or fasciculation      Motor function  Normal muscle bulk and tone; strength 5/5 on all 4 extremities, no pronator drift      Sensory function Intact to light touch, pinprick, vibration, proprioception on all 4 extremities      Cerebellar Intact fine motor movement. No involuntary movements or tremors. No ataxia or dysmetria on finger to nose or heel to shin testing      Reflex function DTR 2+ on bilateral UE and LE, symmetric.       Gait                   normal base and arm swing        ASSESSMENT / PLAN:   Umm Marcus is a 29 y.o. female that established care with neurology for seizure disorder.     There is concern for recent seizure aura. Plan to update routine EEG, AED levels, gently increase Keppra to 1500 mg BID.    Seizure disorder - last observed seizure Oct 2022  increase Keppra 1500 mg BID  Will check keppra levels 2 weeks after increase with CBC, CMP  Update routine EEG  She does not drive  Seizure precautions reiterated      Doris

## 2025-03-27 ENCOUNTER — OFFICE VISIT (OUTPATIENT)
Dept: NEUROLOGY | Age: 30
End: 2025-03-27
Payer: COMMERCIAL

## 2025-03-27 VITALS
SYSTOLIC BLOOD PRESSURE: 105 MMHG | BODY MASS INDEX: 28.1 KG/M2 | HEART RATE: 74 BPM | HEIGHT: 68 IN | DIASTOLIC BLOOD PRESSURE: 69 MMHG | WEIGHT: 185.4 LBS

## 2025-03-27 DIAGNOSIS — G40.209 COMPLEX PARTIAL SEIZURE DISORDER (HCC): Primary | ICD-10-CM

## 2025-03-27 PROCEDURE — 99214 OFFICE O/P EST MOD 30 MIN: CPT | Performed by: PHYSICIAN ASSISTANT

## 2025-03-27 RX ORDER — MIDAZOLAM 5 MG/.1ML
5 SPRAY NASAL EVERY 10 MIN PRN
Qty: 0.2 ML | Refills: 3 | Status: SHIPPED | OUTPATIENT
Start: 2025-03-27

## 2025-03-27 NOTE — PROGRESS NOTES
[address]  [facility] [address]  Dept: [phone]    PATIENT NAME: Umm Marcus  PATIENT MRN: [id number]  PRIMARY CARE PHYSICIAN: Miracle Keith APRN - CNP    HPI:      Umm Marcus is a 29 y.o. female who presents to clinic today for evaluation of history of seizures. She was previously seen through  neurology, Dr. Nick Marlow's practice.    For seizure prevention she takes receives 1500 mg BID. She reports very good medication compliance. She has been taking her medication at 9 AM and 3 PM- previously took it only 4-5 hours apart.     She reports that increased PM dose was very well tolerated until 2 weeks ago when she experienced GTC witnessed by her partner. He heard a tapping from the other room and then found her on the ground with generalized convulsion lasting at least 2 minutes. She did not bite her tongue or lose control of bladder. She was vomiting. Following the episode she was very confused and weak- would try to get up to go to the bathroom and he had to redirect her. She then fell asleep and woke up hours later with no recollection.    No preceding illness, missed medication, change in generic formulation. Her menstural sujatha did start the following day, but she had not had hormonal seizures in the past.    Separately, she has had episodes of feeling off, progressing to katie-vu, then progressing to nausea. This is noted more on days when she wakes early for work. On other occasions, if she over-sleeps and is late for her medications she has had witnessed groaning/ abnormal movements during her sleep. Most often, they are occurring at 8-9 AM.     No recent illnesses.     Updated routine EEG was not completed.     Prior information:    No family history of seizure. No personal history of meningitis/ encephalitis/ head trauma.    Last GTC in Oct 2022 and triggered by 2 missed doses of Keppra. It manifested as decreased LOC with clonic movements of 4 extremities, no tongue-biting

## 2025-05-19 RX ORDER — LEVETIRACETAM 500 MG/1
1500 TABLET ORAL 2 TIMES DAILY
Qty: 180 TABLET | Refills: 5 | Status: SHIPPED | OUTPATIENT
Start: 2025-05-19 | End: 2025-11-15

## 2025-05-19 NOTE — TELEPHONE ENCOUNTER
Pharmacy requesting refill of Keppra 500 mg tablet.    Medication active on med list yes    Date of last Rx: 11/13/2024 #180 with 5 refills   verified by LEANA Salguero    Date of last appointment 3/27/2025    Next Visit Date:  5/29/2025

## 2025-05-27 ENCOUNTER — TELEPHONE (OUTPATIENT)
Dept: NEUROLOGY | Age: 30
End: 2025-05-27

## 2025-05-27 NOTE — TELEPHONE ENCOUNTER
I called [facility] at 1 [phone] and spoke to Puneet.  Coverage is active and current since 11 01 2022.  KS

## 2025-05-29 ENCOUNTER — OFFICE VISIT (OUTPATIENT)
Dept: NEUROLOGY | Age: 30
End: 2025-05-29
Payer: COMMERCIAL

## 2025-05-29 VITALS
DIASTOLIC BLOOD PRESSURE: 75 MMHG | SYSTOLIC BLOOD PRESSURE: 109 MMHG | HEIGHT: 68 IN | HEART RATE: 77 BPM | WEIGHT: 183.4 LBS | BODY MASS INDEX: 27.8 KG/M2

## 2025-05-29 DIAGNOSIS — G40.209 COMPLEX PARTIAL SEIZURE DISORDER (HCC): Primary | ICD-10-CM

## 2025-05-29 PROCEDURE — 99213 OFFICE O/P EST LOW 20 MIN: CPT | Performed by: PHYSICIAN ASSISTANT

## 2025-05-29 NOTE — PROGRESS NOTES
scheduled for June 2025  She does not drive  Seizure precautions reiterated      ZEKE Luther     [facility]

## 2025-06-05 ENCOUNTER — HOSPITAL ENCOUNTER (OUTPATIENT)
Dept: NEUROLOGY | Age: 30
Discharge: HOME | End: 2025-06-05
Payer: COMMERCIAL

## 2025-06-05 DIAGNOSIS — G40.909 SEIZURE DISORDER (HCC): ICD-10-CM

## 2025-06-05 PROCEDURE — 95819 EEG AWAKE AND ASLEEP: CPT

## 2025-06-05 NOTE — PROCEDURES
PROCEDURE NOTE  Date: 6/5/2025   Name: Umm Marcus  YOB: 1995    Procedures            Date: 6/5/2025  Referring physician: Dr. Hinkle    Indication  Patient aged 29 y with possible seizures. EEG done to assess for epileptiform activity.    Introduction  This routine 30-minute EEG was recorded using the [employer] 10-20 System on a [employer] workstation at 256 samples/s. Automated spike and seizure detection algorithms were applied.    Description  During the maximal alert state, a well-regulated, symmetric, and reactive 8-9 Hz posterior dominant rhythm was seen. No consistent focal slowing or interhemispheric asymmetry was noted. Stage I and stage II sleep were observed. There were no interictal epileptiform discharges or electrographic seizures.    Activations  Hyperventilation was not performed. Intermittent photic stimulation was performed and demonstrated no posterior driving response.    Impression  Normal awake and sleep EEG.      No epileptiform discharges were identified. Please note the absence of such activity on this record cannot conclusively rule out an epileptic disorder. If such is still clinically suspected, a repeat study with sleep deprivation and/or prolonged sampling may be helpful.    Joanne Cash MD  Epilepsy Board Certified.  Neurology Board Certified.    Electronically Signed

## (undated) DEVICE — UNDERPANTS MAT L/XL KNIT SEAMLESS CLR CODE WAISTBAND

## (undated) DEVICE — SYSTEM COLL CANSTR LID SEAL CAP W/O TISS TRAP FOR 3/8IN

## (undated) DEVICE — SET COLL TBNG L6FT DIA3/8IN W/ INTEGR SWVL HNDL SLIP RNG M

## (undated) DEVICE — GLOVE SURG SZ 6 THK91MIL LTX FREE SYN POLYISOPRENE ANTI

## (undated) DEVICE — SVMMC GYN MIN PK

## (undated) DEVICE — CAP SEAL COLLECT BTL BL

## (undated) DEVICE — TRAP TISS DISP FOR COLL SYS BERK SAFETOUCH

## (undated) DEVICE — HOSE CONN L18IN UTER DISP FOR BERK SAFETOUCH SYS

## (undated) DEVICE — GARMENT,MEDLINE,DVT,INT,CALF,MED, GEN2: Brand: MEDLINE

## (undated) DEVICE — SOLUTION SCRB 4OZ 2% CHG FOR SURG SCRBBING HND WSH

## (undated) DEVICE — 1000 S-DRAPE TOWEL DRAPE 10/BX: Brand: STERI-DRAPE™

## (undated) DEVICE — Device

## (undated) DEVICE — PAD,SANITARY,11 IN,MAXI,W/WINGS,N-STRL: Brand: MEDLINE

## (undated) DEVICE — GLOVE SURG SZ 65 THK91MIL LTX FREE SYN POLYISOPRENE

## (undated) DEVICE — Z DISCONTINUED USE 2653965 CANNULA VAC DIA7MM CRV SEMI RIG ROUNDED TIP DISP BERK

## (undated) DEVICE — GAUZE,SPONGE,FLUFF,6"X6.75",STRL,5/TRAY: Brand: MEDLINE